# Patient Record
Sex: FEMALE | Race: OTHER | HISPANIC OR LATINO | Employment: FULL TIME | URBAN - METROPOLITAN AREA
[De-identification: names, ages, dates, MRNs, and addresses within clinical notes are randomized per-mention and may not be internally consistent; named-entity substitution may affect disease eponyms.]

---

## 2006-08-18 LAB — EXTERNAL HIV SCREEN: NORMAL

## 2017-06-23 ENCOUNTER — ALLSCRIPTS OFFICE VISIT (OUTPATIENT)
Dept: OTHER | Facility: OTHER | Age: 42
End: 2017-06-23

## 2017-06-23 DIAGNOSIS — Z12.31 ENCOUNTER FOR SCREENING MAMMOGRAM FOR MALIGNANT NEOPLASM OF BREAST: ICD-10-CM

## 2017-06-23 DIAGNOSIS — Z13.6 ENCOUNTER FOR SCREENING FOR CARDIOVASCULAR DISORDERS: ICD-10-CM

## 2017-06-27 ENCOUNTER — ALLSCRIPTS OFFICE VISIT (OUTPATIENT)
Dept: OTHER | Facility: OTHER | Age: 42
End: 2017-06-27

## 2017-06-29 ENCOUNTER — APPOINTMENT (OUTPATIENT)
Dept: LAB | Facility: CLINIC | Age: 42
End: 2017-06-29
Payer: COMMERCIAL

## 2017-06-29 ENCOUNTER — TRANSCRIBE ORDERS (OUTPATIENT)
Dept: LAB | Facility: CLINIC | Age: 42
End: 2017-06-29

## 2017-06-29 DIAGNOSIS — Z13.6 ENCOUNTER FOR SCREENING FOR CARDIOVASCULAR DISORDERS: ICD-10-CM

## 2017-06-29 LAB
ALBUMIN SERPL BCP-MCNC: 3.3 G/DL (ref 3.5–5)
ALP SERPL-CCNC: 102 U/L (ref 46–116)
ALT SERPL W P-5'-P-CCNC: 16 U/L (ref 12–78)
ANION GAP SERPL CALCULATED.3IONS-SCNC: 5 MMOL/L (ref 4–13)
AST SERPL W P-5'-P-CCNC: 11 U/L (ref 5–45)
BASOPHILS # BLD AUTO: 0.04 THOUSANDS/ΜL (ref 0–0.1)
BASOPHILS NFR BLD AUTO: 0 % (ref 0–1)
BILIRUB SERPL-MCNC: 0.39 MG/DL (ref 0.2–1)
BUN SERPL-MCNC: 9 MG/DL (ref 5–25)
CALCIUM SERPL-MCNC: 8.8 MG/DL (ref 8.3–10.1)
CHLORIDE SERPL-SCNC: 104 MMOL/L (ref 100–108)
CHOLEST SERPL-MCNC: 284 MG/DL (ref 50–200)
CO2 SERPL-SCNC: 29 MMOL/L (ref 21–32)
CREAT SERPL-MCNC: 0.6 MG/DL (ref 0.6–1.3)
EOSINOPHIL # BLD AUTO: 0.34 THOUSAND/ΜL (ref 0–0.61)
EOSINOPHIL NFR BLD AUTO: 2 % (ref 0–6)
ERYTHROCYTE [DISTWIDTH] IN BLOOD BY AUTOMATED COUNT: 13.8 % (ref 11.6–15.1)
GFR SERPL CREATININE-BSD FRML MDRD: >60 ML/MIN/1.73SQ M
GLUCOSE P FAST SERPL-MCNC: 98 MG/DL (ref 65–99)
HCT VFR BLD AUTO: 37.2 % (ref 34.8–46.1)
HDLC SERPL-MCNC: 58 MG/DL (ref 40–60)
HGB BLD-MCNC: 12.3 G/DL (ref 11.5–15.4)
LDLC SERPL CALC-MCNC: 180 MG/DL (ref 0–100)
LYMPHOCYTES # BLD AUTO: 4.65 THOUSANDS/ΜL (ref 0.6–4.47)
LYMPHOCYTES NFR BLD AUTO: 30 % (ref 14–44)
MCH RBC QN AUTO: 29.2 PG (ref 26.8–34.3)
MCHC RBC AUTO-ENTMCNC: 33.1 G/DL (ref 31.4–37.4)
MCV RBC AUTO: 88 FL (ref 82–98)
MONOCYTES # BLD AUTO: 1.12 THOUSAND/ΜL (ref 0.17–1.22)
MONOCYTES NFR BLD AUTO: 7 % (ref 4–12)
NEUTROPHILS # BLD AUTO: 9.4 THOUSANDS/ΜL (ref 1.85–7.62)
NEUTS SEG NFR BLD AUTO: 61 % (ref 43–75)
NRBC BLD AUTO-RTO: 0 /100 WBCS
PLATELET # BLD AUTO: 460 THOUSANDS/UL (ref 149–390)
PMV BLD AUTO: 10.1 FL (ref 8.9–12.7)
POTASSIUM SERPL-SCNC: 3.9 MMOL/L (ref 3.5–5.3)
PROT SERPL-MCNC: 8 G/DL (ref 6.4–8.2)
RBC # BLD AUTO: 4.21 MILLION/UL (ref 3.81–5.12)
SODIUM SERPL-SCNC: 138 MMOL/L (ref 136–145)
TRIGL SERPL-MCNC: 229 MG/DL
TSH SERPL DL<=0.05 MIU/L-ACNC: 4.94 UIU/ML (ref 0.36–3.74)
WBC # BLD AUTO: 15.61 THOUSAND/UL (ref 4.31–10.16)

## 2017-06-29 PROCEDURE — 36415 COLL VENOUS BLD VENIPUNCTURE: CPT

## 2017-06-29 PROCEDURE — 80061 LIPID PANEL: CPT

## 2017-06-29 PROCEDURE — 84443 ASSAY THYROID STIM HORMONE: CPT

## 2017-06-29 PROCEDURE — 85025 COMPLETE CBC W/AUTO DIFF WBC: CPT

## 2017-06-29 PROCEDURE — 80053 COMPREHEN METABOLIC PANEL: CPT

## 2017-06-30 ENCOUNTER — ALLSCRIPTS OFFICE VISIT (OUTPATIENT)
Dept: OTHER | Facility: OTHER | Age: 42
End: 2017-06-30

## 2017-07-31 DIAGNOSIS — L40.9 PSORIASIS: ICD-10-CM

## 2017-07-31 DIAGNOSIS — E03.9 HYPOTHYROIDISM: ICD-10-CM

## 2017-10-30 DIAGNOSIS — E78.5 HYPERLIPIDEMIA: ICD-10-CM

## 2018-01-12 VITALS
HEART RATE: 89 BPM | RESPIRATION RATE: 16 BRPM | WEIGHT: 245.4 LBS | DIASTOLIC BLOOD PRESSURE: 80 MMHG | SYSTOLIC BLOOD PRESSURE: 128 MMHG | HEIGHT: 61 IN | BODY MASS INDEX: 46.33 KG/M2 | TEMPERATURE: 97.5 F

## 2018-01-12 NOTE — PROGRESS NOTES
Assessment    1  Encounter for preventive health examination (V70 0) (Z00 00)    Plan  Encounter for screening mammogram for breast cancer    · * MAMMO SCREENING BILATERAL W CAD; Status:Hold For - Scheduling; Requested  [id number]; Health Maintenance    · 1 - Pramod Leblanc MD, Magdy Tiwari  (Obstetrics/Gynecology) Co-Management  *  Status: Hold For -  Scheduling  Requested for: [id number]  Care Summary provided  : Yes   · Benefits of Exercise/Physical Activity; Status:Complete;   Done: [id number]   · Eat a low fat and low cholesterol diet ; Status:Complete;   Done: [id number]   · Some eating tips that can help you lose weight ; Status:Complete;   Done: 70JDD0823   · We recommend that you bring your body mass index down to 26 ; Status:Complete;    Done: 66RGH0106  Screening for cardiovascular condition    · (1) CBC/PLT/DIFF; Status:Active; Requested [id number]:[id number];    · (1) COMPREHENSIVE METABOLIC PANEL; Status:Active; Requested [id number]:[id number];    · (1) LIPID PANEL, FASTING; Status:Active; Requested GHK:[id number];    · (1) TSH; Status:Active; Requested SII:[id number];     Discussion/Summary  health maintenance visit healthy adult female Currently, she eats a healthy diet and has an inadequate exercise regimen  the risks and benefits of cervical cancer screening were discussed next cervical cancer screening is due now referral made Testing was done today for denies  Breast cancer screening: the risks and benefits of breast cancer screening were discussed and mammogram has been ordered  Colorectal cancer screening: colorectal cancer screening is not indicated  Osteoporosis screening: bone mineral density testing is not indicated  Screening lab work includes hemoglobin, glucose, lipid profile and thyroid function testing  The immunizations are up to date  Advice and education were given regarding nutrition, weight bearing exercise, weight loss and sunscreen use  Patient discussion: discussed with the patient  Physical complete  Labs ordered  Await labs and mammomgram    The treatment plan was reviewed with the patient/guardian  The patient/guardian understands and agrees with the treatment plan      Chief Complaint  Pt here for physical and establish pcp      History of Present Illness  HM, Adult Female: The patient is being seen for a health maintenance evaluation  The last health maintenance visit was 7 year(s) ago  Social History: Household members include spouse, 1 daughter(s) and 1 son(s)  She is   Work status: working full time  The patient has never smoked cigarettes  She reports occasional alcohol use  She has never used illicit drugs  General Health: The patient's health since the last visit is described as good  She has regular dental visits  She complains of vision problems  Vision care includes wearing glasses and an eye examination within the last year  She denies hearing loss  Immunizations status: up to date  Lifestyle:  She consumes a diverse and healthy diet  She has weight concerns  Weight control issues: obesity  She does not exercise regularly  She exercises less than three times a week for 30 or more minutes per session  Exercise includes walking  She does not use tobacco  She consumes alcohol  She reports occasional alcohol use  She denies drug use  Reproductive health: the patient is premenopausal   she reports abnormal menses  Menstrual history: LMP: the last menstrual period was 6/23/17  Recent menstrual periods: bleeding has been normal  The cycles have been regular  The duration of her recent periods has been regular  she uses contraception (2010)  For contraception, she has had a tubal occlusion  she is sexually active  She is monogamous with a male partner  pregnancy history: G 3P 1, 2(miscarriages: 1 )  Screening: cancer screening reviewed and updated  Cervical cancer screening includes a pap smear performed 2012   Breast cancer screening includes no previous mammogram  She hasn't been previously screened for colorectal cancer  metabolic screening reviewed and updated  Metabolic screening includes no previous lipid profile, no previous glucose screening and no previous thyroid function test    risk screening reviewed and current  Cardiovascular risk factors: no illicit drug use  Safety elements used: seat belt, sunscreen, smoke detector and carbon monoxide detector, but no CPR training for the patient  Risk findings: no domestic violence, no guns at home and no travel to developing areas  HPI: patient very involved in using essential oils and alternative medicine        Review of Systems    Constitutional: No fever, no chills, feels well, no tiredness, no recent weight gain or weight loss  Eyes: No complaints of eye pain, no red eyes, no eyesight problems, no discharge, no dry eyes, no itching of eyes  ENT: no complaints of earache, no loss of hearing, no nose bleeds, no nasal discharge, no sore throat, no hoarseness  Cardiovascular: No complaints of slow heart rate, no fast heart rate, no chest pain, no palpitations, no leg claudication, no lower extremity edema  Respiratory: No complaints of shortness of breath, no wheezing, no cough, no SOB on exertion, no orthopnea, no PND  Gastrointestinal: No complaints of abdominal pain, no constipation, no nausea or vomiting, no diarrhea, no bloody stools  Genitourinary: No complaints of dysuria, no incontinence, no pelvic pain, no dysmenorrhea, no vaginal discharge or bleeding  Musculoskeletal: No complaints of arthralgias, no myalgias, no joint swelling or stiffness, no limb pain or swelling  Integumentary: No complaints of skin rash or lesions, no itching, no skin wounds, no breast pain or lump  Neurological: No complaints of headache, no confusion, no convulsions, no numbness, no dizziness or fainting, no tingling, no limb weakness, no difficulty walking     Psychiatric: Not suicidal, no sleep disturbance, no anxiety or depression, no change in personality, no emotional problems  Endocrine: No complaints of proptosis, no hot flashes, no muscle weakness, no deepening of the voice, no feelings of weakness  Hematologic/Lymphatic: No complaints of swollen glands, no swollen glands in the neck, does not bleed easily, does not bruise easily  ROS reviewed  Past Medical History    · History of asthma (V12 69) (Z87 09)   · History of skin disorder (V13 3) (Z87 2)    Surgical History    · History of  Section   · History of Knee Surgery    Family History  Mother    · Family history of hypertension (V17 49) (Z82 49)  Paternal Grandmother    · Family history of malignant neoplasm of stomach (V16 0) (Z80 0)  Paternal Uncle    · Family history of malignant neoplasm of stomach (V16 0) (Z80 0)    Social History    · Always uses seat belt   · [employer] ([address]   · Employed   · Has 2 children   · Lives in private house   · Lives with children   · Lives with spouse   ·    · No advance directives (V49 89) (Z78 9)   · No alcohol use   · No caffeine use   · No history of alcohol use (Z78 9)   · No living will   · Primary language is English   · Supportive and safe    Current Meds   1  No Reported Medications Recorded    Allergies    1  No Known Environmental Allergies   2  No Known Food Allergies    Vitals   Recorded: 13KLO4336 09:31AM   Temperature 97 5 F   Heart Rate 89   Respiration 16   Systolic 639   Diastolic 80   Height 5 ft 1 2 in   Weight 245 lb 6 4 oz   BMI Calculated 46 06   BSA Calculated 2 06     Physical Exam    Constitutional   General appearance: No acute distress, well appearing and well nourished  well developed and well nourished  Head and Face   Head and face: Normal     Eyes   Conjunctiva and lids: No swelling, erythema or discharge      Ears, Nose, Mouth, and Throat   External inspection of ears and nose: Normal     Otoscopic examination: Abnormal   The left tympanic membrane was normal  The right external canal had a cerumen impaction  The left external canal was normal    Nasal mucosa, septum, and turbinates: Normal without edema or erythema  Lips, teeth, and gums: Normal, good dentition  Oropharynx: Normal with no erythema, edema, exudate or lesions  Neck   Neck: Supple, symmetric, trachea midline, no masses  Thyroid: Normal, no thyromegaly  Pulmonary   Respiratory effort: No increased work of breathing or signs of respiratory distress  Auscultation of lungs: Clear to auscultation  Cardiovascular   Palpation of heart: Normal PMI, no thrills  Auscultation of heart: Normal rate and rhythm, normal S1 and S2, no murmurs  Examination of extremities for edema and/or varicosities: Normal     Musculoskeletal   Gait and station: Normal     Skin   Examination of the skin for lesions: Abnormal   Multiple plaque(s) with expanding borders, without sharply demarcated borders  Described as with scales, but non-tender and not warm  (large amounts on both lower extremities extending up to knee  behind the neck large amounts  )   Palpation of skin and subcutaneous tissue: Normal turgor  Psychiatric   Judgment and insight: Normal     Orientation to person, place, and time: Normal     Mood and affect: Normal        Results/Data  PHQ-9 Adult Depression Screening 23Jun2017 09:35AM User, [facility]     Test Name Result Flag Reference   PHQ-9 Adult Depression Score 0     Over the last two weeks, how often have you been bothered by any of the following problems?   Little interest or pleasure in doing things: Not at all - 0  Feeling down, depressed, or hopeless: Not at all - 0  Trouble falling or staying asleep, or sleeping too much: Not at all - 0  Feeling tired or having little energy: Not at all - 0  Poor appetite or over eating: Not at all - 0  Feeling bad about yourself - or that you are a failure or have let yourself or your family down: Not at all - 0  Trouble concentrating on things, such as reading the newspaper or watching television: Not at all - 0  Moving or speaking so slowly that other people could have noticed  Or the opposite -  being so fidgety or restless that you have been moving around a lot more than usual: Not at all - 0  Thoughts that you would be better off dead, or of hurting yourself in some way: Not at all - 0   PHQ-9 Adult Depression Screening Negative     PHQ-9 Difficulty Level Not difficult at all     PHQ-9 Severity No Depression         Signatures   Electronically signed by : Jin Gordon; Jun 23 2017 10:12AM EST                       (Author)    Electronically signed by :  Nicole Dsouza MD; Jun 23 2017 11:11AM EST                       (Author)

## 2018-01-13 VITALS
BODY MASS INDEX: 46.67 KG/M2 | HEART RATE: 84 BPM | TEMPERATURE: 97.7 F | SYSTOLIC BLOOD PRESSURE: 126 MMHG | RESPIRATION RATE: 18 BRPM | WEIGHT: 247.2 LBS | DIASTOLIC BLOOD PRESSURE: 82 MMHG | HEIGHT: 61 IN

## 2018-01-13 VITALS
TEMPERATURE: 98.1 F | HEART RATE: 82 BPM | BODY MASS INDEX: 46.63 KG/M2 | SYSTOLIC BLOOD PRESSURE: 118 MMHG | WEIGHT: 246.99 LBS | RESPIRATION RATE: 18 BRPM | DIASTOLIC BLOOD PRESSURE: 76 MMHG | HEIGHT: 61 IN

## 2018-02-01 PROBLEM — E78.5 HYPERLIPIDEMIA: Status: ACTIVE | Noted: 2017-06-30

## 2018-02-01 PROBLEM — L40.9 PSORIASIS: Status: ACTIVE | Noted: 2017-06-23

## 2018-02-01 PROBLEM — E03.9 HYPOTHYROID: Status: ACTIVE | Noted: 2017-06-30

## 2018-02-02 ENCOUNTER — OFFICE VISIT (OUTPATIENT)
Dept: FAMILY MEDICINE CLINIC | Facility: CLINIC | Age: 43
End: 2018-02-02
Payer: COMMERCIAL

## 2018-02-02 VITALS
RESPIRATION RATE: 16 BRPM | BODY MASS INDEX: 47.73 KG/M2 | DIASTOLIC BLOOD PRESSURE: 70 MMHG | TEMPERATURE: 98.4 F | HEART RATE: 90 BPM | SYSTOLIC BLOOD PRESSURE: 118 MMHG | HEIGHT: 61 IN | WEIGHT: 252.8 LBS

## 2018-02-02 DIAGNOSIS — E03.9 ACQUIRED HYPOTHYROIDISM: ICD-10-CM

## 2018-02-02 DIAGNOSIS — L40.9 PSORIASIS: Primary | ICD-10-CM

## 2018-02-02 DIAGNOSIS — E78.00 PURE HYPERCHOLESTEROLEMIA: ICD-10-CM

## 2018-02-02 PROCEDURE — 99214 OFFICE O/P EST MOD 30 MIN: CPT | Performed by: NURSE PRACTITIONER

## 2018-02-02 RX ORDER — CLOBETASOL PROPIONATE 0.5 MG/G
CREAM TOPICAL 2 TIMES DAILY
Qty: 60 G | Refills: 3 | Status: SHIPPED | OUTPATIENT
Start: 2018-02-02 | End: 2019-10-15 | Stop reason: SDUPTHER

## 2018-02-02 NOTE — PROGRESS NOTES
Assessment/Plan:    Acquired hypothyroidism  Patient has been taking thyromin capsules  Pure hypercholesterolemia  Patient has been monitoring diet  Due for lab recheck  Diagnoses and all orders for this visit:    Psoriasis  -     clobetasol (TEMOVATE) 0 05 % cream; Apply topically 2 (two) times a day    Acquired hypothyroidism  Comments:  No thyroid studies ordered today  We will wait to check adherance to thyromin  Recheck in about 3 months  Pure hypercholesterolemia  Comments:  we will recheck labs at next thyroid check  Other orders  -     Cancel: TSH, 3rd generation  -     Cancel: T4, free; Future  -     Cancel: T3, free; Future  -     Cancel: Lipid panel          Subjective:      Patient ID: Chilango Otto is a 43 y o  female  Psoriasis   This is a chronic problem  The current episode started more than 1 year ago  The problem has been gradually worsening since onset  The affected locations include the scalp, left buttock, right buttock, left ear, right eye, groin, right ear, left upper leg, left lower leg, right upper leg, right lower leg, back and chest  Associated symptoms include pain (skin sensitivity) and plaques  Pertinent negatives include no arthritis  Past treatments include moisturizers (shea butter, cocunut oil TID , shampoo)  Thyroid Problem   Presents for follow-up visit  Patient reports no fatigue  (Patient states she has not been taking the thyromin  She only did for about a month  ) Symptom course: unsure  The following portions of the patient's history were reviewed and updated as appropriate: allergies, current medications, past family history, past medical history, past social history, past surgical history and problem list     Review of Systems   Constitutional: Negative for chills and fatigue  Respiratory: Negative for chest tightness and shortness of breath  Cardiovascular: Negative for chest pain  Musculoskeletal: Negative for arthritis     Skin: Positive for rash  Objective:     Physical Exam   Constitutional: Vital signs are normal  She appears well-developed and well-nourished  Neck: No thyromegaly present  Cardiovascular: Normal rate, regular rhythm and normal heart sounds  Pulmonary/Chest: Effort normal and breath sounds normal    Skin: Rash noted  Rash is macular  Large amounts of plaques and scales throughout the body, chest, upper limbs, lower limbs, ear, neck, back and scalp  The face is not involved

## 2018-03-30 ENCOUNTER — APPOINTMENT (OUTPATIENT)
Dept: LAB | Facility: CLINIC | Age: 43
End: 2018-03-30
Payer: COMMERCIAL

## 2018-03-30 ENCOUNTER — TRANSCRIBE ORDERS (OUTPATIENT)
Dept: LAB | Facility: CLINIC | Age: 43
End: 2018-03-30

## 2018-03-30 DIAGNOSIS — Z79.899 NEED FOR PROPHYLACTIC CHEMOTHERAPY: Primary | ICD-10-CM

## 2018-03-30 LAB
ALBUMIN SERPL BCP-MCNC: 3.1 G/DL (ref 3.5–5)
ALP SERPL-CCNC: 97 U/L (ref 46–116)
ALT SERPL W P-5'-P-CCNC: 13 U/L (ref 12–78)
ANION GAP SERPL CALCULATED.3IONS-SCNC: 5 MMOL/L (ref 4–13)
AST SERPL W P-5'-P-CCNC: 11 U/L (ref 5–45)
BASOPHILS # BLD AUTO: 0.02 THOUSANDS/ΜL (ref 0–0.1)
BASOPHILS NFR BLD AUTO: 0 % (ref 0–1)
BILIRUB SERPL-MCNC: 0.7 MG/DL (ref 0.2–1)
BUN SERPL-MCNC: 13 MG/DL (ref 5–25)
CALCIUM SERPL-MCNC: 8.6 MG/DL (ref 8.3–10.1)
CHLORIDE SERPL-SCNC: 106 MMOL/L (ref 100–108)
CO2 SERPL-SCNC: 27 MMOL/L (ref 21–32)
CREAT SERPL-MCNC: 0.6 MG/DL (ref 0.6–1.3)
EOSINOPHIL # BLD AUTO: 0.48 THOUSAND/ΜL (ref 0–0.61)
EOSINOPHIL NFR BLD AUTO: 3 % (ref 0–6)
ERYTHROCYTE [DISTWIDTH] IN BLOOD BY AUTOMATED COUNT: 13.6 % (ref 11.6–15.1)
GFR SERPL CREATININE-BSD FRML MDRD: 113 ML/MIN/1.73SQ M
GLUCOSE P FAST SERPL-MCNC: 93 MG/DL (ref 65–99)
HAV IGM SER QL: NORMAL
HBV CORE IGM SER QL: NORMAL
HBV SURFACE AG SER QL: NORMAL
HCT VFR BLD AUTO: 36.7 % (ref 34.8–46.1)
HCV AB SER QL: NORMAL
HGB BLD-MCNC: 12.1 G/DL (ref 11.5–15.4)
LYMPHOCYTES # BLD AUTO: 4.54 THOUSANDS/ΜL (ref 0.6–4.47)
LYMPHOCYTES NFR BLD AUTO: 31 % (ref 14–44)
MCH RBC QN AUTO: 28.9 PG (ref 26.8–34.3)
MCHC RBC AUTO-ENTMCNC: 33 G/DL (ref 31.4–37.4)
MCV RBC AUTO: 88 FL (ref 82–98)
MONOCYTES # BLD AUTO: 1.05 THOUSAND/ΜL (ref 0.17–1.22)
MONOCYTES NFR BLD AUTO: 7 % (ref 4–12)
NEUTROPHILS # BLD AUTO: 8.49 THOUSANDS/ΜL (ref 1.85–7.62)
NEUTS SEG NFR BLD AUTO: 59 % (ref 43–75)
NRBC BLD AUTO-RTO: 0 /100 WBCS
PLATELET # BLD AUTO: 403 THOUSANDS/UL (ref 149–390)
PMV BLD AUTO: 10.1 FL (ref 8.9–12.7)
POTASSIUM SERPL-SCNC: 4.1 MMOL/L (ref 3.5–5.3)
PROT SERPL-MCNC: 7.6 G/DL (ref 6.4–8.2)
RBC # BLD AUTO: 4.19 MILLION/UL (ref 3.81–5.12)
SODIUM SERPL-SCNC: 138 MMOL/L (ref 136–145)
WBC # BLD AUTO: 14.62 THOUSAND/UL (ref 4.31–10.16)

## 2018-03-30 PROCEDURE — 80074 ACUTE HEPATITIS PANEL: CPT

## 2018-03-30 PROCEDURE — 86480 TB TEST CELL IMMUN MEASURE: CPT

## 2018-03-30 PROCEDURE — 36415 COLL VENOUS BLD VENIPUNCTURE: CPT

## 2018-03-30 PROCEDURE — 80053 COMPREHEN METABOLIC PANEL: CPT

## 2018-03-30 PROCEDURE — 85025 COMPLETE CBC W/AUTO DIFF WBC: CPT

## 2018-04-01 LAB
ANNOTATION COMMENT IMP: NORMAL
GAMMA INTERFERON BACKGROUND BLD IA-ACNC: 0.03 IU/ML
M TB IFN-G BLD-IMP: NEGATIVE
M TB IFN-G CD4+ BCKGRND COR BLD-ACNC: 0.01 IU/ML
M TB IFN-G CD4+ T-CELLS BLD-ACNC: 0.04 IU/ML
MITOGEN IGNF BLD-ACNC: 6.13 IU/ML
QUANTIFERON-TB GOLD IN TUBE: NORMAL
SERVICE CMNT-IMP: NORMAL

## 2018-08-21 ENCOUNTER — CLINICAL SUPPORT (OUTPATIENT)
Dept: BARIATRICS | Facility: CLINIC | Age: 43
End: 2018-08-21

## 2018-08-21 VITALS
HEIGHT: 61 IN | BODY MASS INDEX: 49.84 KG/M2 | HEART RATE: 80 BPM | SYSTOLIC BLOOD PRESSURE: 122 MMHG | DIASTOLIC BLOOD PRESSURE: 84 MMHG | TEMPERATURE: 97.4 F | WEIGHT: 264 LBS

## 2018-08-21 DIAGNOSIS — E66.01 MORBID (SEVERE) OBESITY DUE TO EXCESS CALORIES (HCC): Primary | ICD-10-CM

## 2018-08-21 DIAGNOSIS — E66.01 MORBID OBESITY (HCC): Primary | ICD-10-CM

## 2018-08-21 PROCEDURE — RECHECK: Performed by: DIETITIAN, REGISTERED

## 2018-08-21 NOTE — PROGRESS NOTES
Bariatric Behavioral Health Evaluation    Presenting Problem: Patient has been unsuccessful with weight loss efforts  Patient wants to improve quality of life  Is the patient seeking Bariatric Surgery Eval? Yes  If yes how long have you researched this surgery option  Patient has considered bariatric surgery for 3 years  Psychiatric/Psychological Treatment Diagnosis: None reported     Outpatient Counselor No     Psychiatrist No     Have you had Inpatient Treatment? No    Family Constellation (include relationship with each and Psych/Med HX) None reported      Mother is 61years old, father is 72years old, 2 sisters, and 1 brother   14 years; spouse supports bariatric surgery  Patient has 2 children  Domestic Violence No    Abuse History:   None reported     Additional comments/stressors related to family/relationships/peer support: current weight  Physical/Psychological Assessment:     Appearance: appropriate  Sociability: average  Affect: appropriate  Mood: calm  Thought Process: coherent  Speech: normal  Content: no impairment  Orientation: person  Yes   Insight: emotional  good    Risk Assessment:     none    Recommendations: Recommended for surgery  yes    Risk of Harm to Self or Others:  None reported     Access to weapons no     Based on the previous information, the client presents the following risk of harm to self or others: low     Note   Cassandra Orellana Completed Behavioral Health Assessment  Provided patient education as needed  Patient denies  to  Axis 1 diagnosis  Patient  meets criteria for [facility] bariatric  surgery program and is therefore referred to surgeon       BARIATRIC SURGERY EDUCATION CHECKLIST    I have received education related to my bariatric surgery process and understand:    Patients may be required to complete a psychiatric evaluation and receive clearance for surgery from their psychiatrist     Patients who undergo weight loss surgery are at higher risk of increased mental health concerns and suicide attempts  Patients may be required to complete a full substance abuse evaluation and then complete all treatment recommendations prior to surgery  If diagnosis of abuse/dependence results, patient may be required to remain sober for one (1) year before having bariatric surgery  Patients on psychiatric medications should check with their provider to discuss psychiatric medications and the changes in absorption  Patient should discuss all time release medications with provider and take all medications as prescribed  The recommendation is that there is no use of  any tobacco products, Hookah or  vapes for the bariatric post-operation patient  Bariatric surgery patients should not consume alcohol as a post-operative patient as it may increase risk of numerous health conditions including but not limited to alcohol abuse and ulcers  There is a possibility of weight regain if patient does not follow all program guidelines and recommendations  Bariatric surgery patients should exercise thirty (30) to sixty (60) minutes per day to maintain post-surgical weight loss  Research indicates that bariatric patients are more successful when they see a therapist for up to two (2) years post-op  Patients will follow all medical and dietary recommendations provided  Patient will keep all scheduled appointments and follow up with their physician for a minimum of five (5) years  Patient will take all vitamins as recommended  Post-operative vitamins are life-long  Patient reviewed Bariatric Surgery Education Checklist and agrees they have received education on these issues

## 2018-08-21 NOTE — PROGRESS NOTES
Bariatric Nutrition Assessment Note    Type of surgery    Preop  Surgery Date: pendign  Surgeon: Dr Jacquie Navarrete  37 y o   female     Wt with BMI of 25: 132 4#  Pre-Op Excess Wt: 131 6#  Blood pressure 122/84, pulse 80, temperature (!) 97 4 °F (36 3 °C), temperature source Tympanic, height 5' 1" (1 549 m), weight 120 kg (264 lb), not currently breastfeeding      Weight History   Onset of Obesity: Childhood  Family history of obesity: Yes  Wt Loss Attempts: Counseling with  MD  Exercise  High Protein/Low CHO diets (Atkins, Union, etc )  Self Created Diets (Portion Control, Healthy Food Choices, etc )  Maximum Wt Lost: 20 #    Review of History and Medications   Past Medical History:   Diagnosis Date    Asthma     Disease of thyroid gland     Morbid obesity ([address] )     Psoriasis     Skin disorder      Past Surgical History:   Procedure Laterality Date     SECTION      KNEE SURGERY       Social History     Social History    Marital status: /Civil Union     Spouse name: N/A    Number of children: 2    Years of education: N/A     Occupational History    Employed      Social History Main Topics    Smoking status: Never Smoker    Smokeless tobacco: Never Used    Alcohol use Yes      Comment: social     Drug use: No    Sexual activity: Yes     Partners: Male     Birth control/ protection: None     Other Topics Concern    None     Social History Narrative    Always uses seat belt    [employer]    Lives in private house    Lives with children    Lives with spouse    No advance directives    No caffeine use    No Living will    Supportive and safe                   Current Outpatient Prescriptions:     ADIPEX-P 37 5 MG tablet, Take 1 tablet by mouth daily, Disp: , Rfl: 1    clobetasol (TEMOVATE) 0 05 % cream, Apply topically 2 (two) times a day, Disp: 60 g, Rfl: 3    Secukinumab (COSENTYX SC), Inject under the skin, Disp: , Rfl:   Food Intake and Lifestyle Assessment   Food Intake Assessment completed via food log brought by patient  Breakfast: young living protein shake (25 grams protein, 10 grams sugar) mixed with almond milk  Snack: none  Lunch: left overs from dinner  Snack: handful of peanuts  Dinner: chicken, quinoa, pepper  Snack: usually something sweet, cookies or ice cream  Beverage intake:8-12oz of coffee with milk and sugar, water (at least 64 oz), sweetened iced tea,   Protein supplement: Young living protein powder (25 grams protein whey concentrate, pea protein isolate; 10 grams sugar) mixes with almond milk  Estimated protein intake per day: at least 60 grams  Estimated fluid intake per day: >64 oz   Meals eaten away from home: 1 meal a week (pizza, chinese)  Typical meal pattern: 3 meals per day and 1-2 snacks per day  Eating Behaviors: Large portion sizes, Emotional eating and Craves sweet foods  Food allergies or intolerances: No Known Allergies  Cultural or Adventist considerations: n/a    Physical Assessment  Physical Activity  Types of exercise: walking the dog (20 minutes daily)  Current physical limitations: knee pain    Psychosocial Assessment   Support systems: spouse  Socioeconomic factors: n/a    Nutrition Diagnosis  Diagnosis: Overweight / Obesity (NC-3 3), Undesirable food choices (NB-1 7) and Disordered eating pattern (NB-1 5)  Related to: Physical inactivity and Excessive energy intake  As Evidenced by: BMI >25     Nutrition Prescription: Recommend the following diet  Low fat, Low sugar and High protein    Interventions and Teaching   Discussed pre-op and post-op nutrition guidelines  Patient educated and handouts provided    Capacity of post-surgery stomach  Diet progression  Adequate hydration  Sugar and fat restriction to decrease "dumping syndrome"  Exercise  Suggestions for pre-op diet  Nutrition considerations after surgery  Protein supplements  Meal planning and preparation  Dietary and lifestyle changes  Intuitive eating  Techniques for self monitoring and keeping daily food journal  Potential for food intolerance after surgery, and ways to deal with them including: lactose intolerance, nausea, reflux, vomiting, diarrhea, food intolerance, appetite changes, gas  Vitamin / Mineral supplementation of Multivitamin with minerals and Vitamin D    Education provided to: patient    Barriers to learning: No barriers identified  Readiness to change: action    Prior research on procedure: friends or family    Comprehension: verbalizes understanding     Expected Compliance: good  Recommendations  Pt is an appropriate candidate for surgery   Yes    Evaluation / Monitoring  Dietitian to Monitor: Eating pattern as discussed Tolerance of nutrition prescription Body weight Lab values Physical activity    Goals  Eliminate sugar sweetened beverages, Food journal, Exercise 30 minutes 5 times per week, Complete lession plans 1-6 and Eat 3 meals per day    Time Spent:   45 Minutes

## 2018-08-31 ENCOUNTER — OFFICE VISIT (OUTPATIENT)
Dept: BARIATRICS | Facility: CLINIC | Age: 43
End: 2018-08-31
Payer: COMMERCIAL

## 2018-08-31 VITALS
BODY MASS INDEX: 49.65 KG/M2 | TEMPERATURE: 97.6 F | SYSTOLIC BLOOD PRESSURE: 122 MMHG | WEIGHT: 263 LBS | DIASTOLIC BLOOD PRESSURE: 86 MMHG | HEIGHT: 61 IN | HEART RATE: 84 BPM

## 2018-08-31 DIAGNOSIS — E66.01 MORBID (SEVERE) OBESITY DUE TO EXCESS CALORIES (HCC): Primary | ICD-10-CM

## 2018-08-31 DIAGNOSIS — E78.00 PURE HYPERCHOLESTEROLEMIA: ICD-10-CM

## 2018-08-31 PROCEDURE — 99204 OFFICE O/P NEW MOD 45 MIN: CPT | Performed by: SURGERY

## 2018-08-31 NOTE — LETTER
2018     Baljinder Shaffer, [address] [facility]    Patient: Dulce Pineda   YOB: 1975   Date of Visit: 2018       Dear Dr Yusuf Dumont: Thank you for referring Dulce Pineda to me for evaluation  Below are my notes for this consultation  If you have questions, please do not hesitate to call me  I look forward to following your patient along with you  Sincerely,        Alessandra Greene MD        CC: No Recipients  Alessandra Greene MD  2018  9:58 AM  Sign at close encounter      [address] [place] [address] 37 y o  female MRN: [id number]  Unit/Bed#:  Encounter: [id number]      HPI:  Dulce Pineda is a 37 y o  female who presents with a longstanding history of morbid obesity and inability to sustain a meaningful weight loss  Here today to discuss bariatric options  Visit type: initial visit    Symptoms: inability to loss weight    Associated Symptoms: anxiety    Associated Conditions: hyperlipidemia and abdominal obesity  Disease Complications: none  Weight Loss Interest: high  Previous Diet Trials: low calorie     Exercise Frequency:infrequency  Types of Exercise: walking        Review of Systems   All other systems reviewed and are negative        Historical Information   Past Medical History:   Diagnosis Date    Asthma     Disease of thyroid gland     Morbid obesity ([address] )     Psoriasis     Skin disorder      Past Surgical History:   Procedure Laterality Date     SECTION      KNEE SURGERY       Social History   History   Alcohol Use    Yes     Comment: social      History   Drug Use No     History   Smoking Status    Never Smoker   Smokeless Tobacco    Never Used     Family History: non-contributory    Meds/Allergies   all medications and allergies reviewed  No Known Allergies    Objective       Current Vitals:   Blood Pressure: 122/86 (18 0932)  Pulse: 84 (08/31/18 0932)  Temperature: 97 6 °F (36 4 °C) (08/31/18 0932)  Temp Source: Tympanic (08/31/18 0932)  Height: 5' 1" (154 9 cm) (08/31/18 0932)  Weight - Scale: 119 kg (263 lb) (08/31/18 0932)        Invasive Devices          No matching active lines, drains, or airways          Physical Exam   Constitutional: She is oriented to person, place, and time  She appears well-developed and well-nourished  No distress  Neurological: She is alert and oriented to person, place, and time  Psychiatric: She has a normal mood and affect  Her behavior is normal  Judgment and thought content normal    Vitals reviewed  Lab Results: I have personally reviewed pertinent lab results  Imaging: I have personally reviewed pertinent reports  EKG, Pathology, and Other Studies: I have personally reviewed pertinent reports  Assessment/PLAN:    37 y o  yo female with a long standing h/o of obesity and inability to sustain any meaningful weight loss on her own despite several attempts  She is interested in the Laparoscopic gastric bypass or sleeve gastrectomy  As a part of her pre op evaluation, she will be referred to a cardiologist and for a sleep evaluation and consult  She needs an EGD to evaluate the anatomy of her GI tract prior to the operation  I have spent over 45 minutes with her face to face in the office today discussing her options and details of the surgery  We have seen an animation of the surgery on the computer that illustrates how the operation is done and how the anatomy will be altered with the procedure  Over 50% of this was coordinating care  She was given the opportunity to ask questions and I have answered all of them  I have discussed and educated the patient with regards to the components of our multidisciplinary program and the importance of compliance and follow up in the post operative period   The patient was also instructed with regards to the importance of behavior modification, nutritional counseling, support meeting attendance and lifestyle changes that are important to ensure success  Although there is a great statistical chance of improvement or even resolution of most of her associated comorbidities, the results vary from patient to patient and they largely depend on her commitment and compliance  She needs to lose 13 lbs prior to the operation        Mitchel Grady MD  8/31/2018  9:45 AM

## 2018-08-31 NOTE — PROGRESS NOTES
BARIATRIC INITIAL CONSULT - BARIATRIC SURGERY    Leana Alfaro 37 y o  female MRN: [id number]  Unit/Bed#:  Encounter: [id number]      HPI:  Leana Alfaro is a 37 y o  female who presents with a longstanding history of morbid obesity and inability to sustain a meaningful weight loss  Here today to discuss bariatric options  Visit type: initial visit    Symptoms: inability to loss weight    Associated Symptoms: anxiety    Associated Conditions: hyperlipidemia and abdominal obesity  Disease Complications: none  Weight Loss Interest: high  Previous Diet Trials: low calorie     Exercise Frequency:infrequency  Types of Exercise: walking        Review of Systems   All other systems reviewed and are negative  Historical Information   Past Medical History:   Diagnosis Date    Asthma     Disease of thyroid gland     Morbid obesity ([address] )     Psoriasis     Skin disorder      Past Surgical History:   Procedure Laterality Date     SECTION      KNEE SURGERY       Social History   History   Alcohol Use    Yes     Comment: social      History   Drug Use No     History   Smoking Status    Never Smoker   Smokeless Tobacco    Never Used     Family History: non-contributory    Meds/Allergies   all medications and allergies reviewed  No Known Allergies    Objective       Current Vitals:   Blood Pressure: 122/86 (18)  Pulse: 84 (18)  Temperature: 97 6 °F (36 4 °C) (18)  Temp Source: Tympanic (18)  Height: 5' 1" (154 9 cm) (18)  Weight - Scale: 119 kg (263 lb) (18)        Invasive Devices          No matching active lines, drains, or airways          Physical Exam   Constitutional: She is oriented to person, place, and time  She appears well-developed and well-nourished  No distress  Neurological: She is alert and oriented to person, place, and time  Psychiatric: She has a normal mood and affect   Her behavior is normal  Judgment and thought content normal    Vitals reviewed  Lab Results: I have personally reviewed pertinent lab results  Imaging: I have personally reviewed pertinent reports  EKG, Pathology, and Other Studies: I have personally reviewed pertinent reports  Assessment/PLAN:    37 y o  yo female with a long standing h/o of obesity and inability to sustain any meaningful weight loss on her own despite several attempts  She is interested in the Laparoscopic gastric bypass or sleeve gastrectomy  As a part of her pre op evaluation, she will be referred to a cardiologist and for a sleep evaluation and consult  She needs an EGD to evaluate the anatomy of her GI tract prior to the operation  I have spent over 45 minutes with her face to face in the office today discussing her options and details of the surgery  We have seen an animation of the surgery on the computer that illustrates how the operation is done and how the anatomy will be altered with the procedure  Over 50% of this was coordinating care  She was given the opportunity to ask questions and I have answered all of them  I have discussed and educated the patient with regards to the components of our multidisciplinary program and the importance of compliance and follow up in the post operative period  The patient was also instructed with regards to the importance of behavior modification, nutritional counseling, support meeting attendance and lifestyle changes that are important to ensure success  Although there is a great statistical chance of improvement or even resolution of most of her associated comorbidities, the results vary from patient to patient and they largely depend on her commitment and compliance  She needs to lose 13 lbs prior to the operation        Alessandra Greene MD  8/31/2018  9:45 AM

## 2018-09-11 ENCOUNTER — ANESTHESIA EVENT (OUTPATIENT)
Dept: GASTROENTEROLOGY | Facility: HOSPITAL | Age: 43
End: 2018-09-11
Payer: COMMERCIAL

## 2018-09-12 ENCOUNTER — ANESTHESIA (OUTPATIENT)
Dept: GASTROENTEROLOGY | Facility: HOSPITAL | Age: 43
End: 2018-09-12
Payer: COMMERCIAL

## 2018-09-12 ENCOUNTER — HOSPITAL ENCOUNTER (OUTPATIENT)
Facility: HOSPITAL | Age: 43
Setting detail: OUTPATIENT SURGERY
Discharge: HOME | End: 2018-09-12
Attending: SURGERY | Admitting: SURGERY
Payer: COMMERCIAL

## 2018-09-12 VITALS
OXYGEN SATURATION: 98 % | WEIGHT: 240 LBS | HEIGHT: 61 IN | DIASTOLIC BLOOD PRESSURE: 75 MMHG | BODY MASS INDEX: 45.31 KG/M2 | HEART RATE: 75 BPM | SYSTOLIC BLOOD PRESSURE: 122 MMHG | TEMPERATURE: 98.2 F | RESPIRATION RATE: 18 BRPM

## 2018-09-12 DIAGNOSIS — E66.01 MORBID OBESITY (HCC): ICD-10-CM

## 2018-09-12 LAB — EXT PREGNANCY TEST URINE: NEGATIVE

## 2018-09-12 PROCEDURE — 81025 URINE PREGNANCY TEST: CPT | Performed by: ANESTHESIOLOGY

## 2018-09-12 PROCEDURE — 88342 IMHCHEM/IMCYTCHM 1ST ANTB: CPT | Performed by: PATHOLOGY

## 2018-09-12 PROCEDURE — 43239 EGD BIOPSY SINGLE/MULTIPLE: CPT | Performed by: SURGERY

## 2018-09-12 PROCEDURE — 88305 TISSUE EXAM BY PATHOLOGIST: CPT | Performed by: PATHOLOGY

## 2018-09-12 RX ORDER — PROPOFOL 10 MG/ML
INJECTION, EMULSION INTRAVENOUS AS NEEDED
Status: DISCONTINUED | OUTPATIENT
Start: 2018-09-12 | End: 2018-09-12 | Stop reason: SURG

## 2018-09-12 RX ORDER — SODIUM CHLORIDE 9 MG/ML
125 INJECTION, SOLUTION INTRAVENOUS CONTINUOUS
Status: DISCONTINUED | OUTPATIENT
Start: 2018-09-12 | End: 2018-09-12 | Stop reason: HOSPADM

## 2018-09-12 RX ADMIN — PROPOFOL 80 MG: 10 INJECTION, EMULSION INTRAVENOUS at 08:28

## 2018-09-12 RX ADMIN — SODIUM CHLORIDE 125 ML/HR: 0.9 INJECTION, SOLUTION INTRAVENOUS at 07:10

## 2018-09-12 RX ADMIN — PROPOFOL 100 MG: 10 INJECTION, EMULSION INTRAVENOUS at 08:30

## 2018-09-12 RX ADMIN — LIDOCAINE HYDROCHLORIDE 60 MG: 20 INJECTION, SOLUTION INTRAVENOUS at 08:28

## 2018-09-12 RX ADMIN — PROPOFOL 20 MG: 10 INJECTION, EMULSION INTRAVENOUS at 08:31

## 2018-09-12 NOTE — PROGRESS NOTES
D/C instructions given and pt verbalizes understanding  OOB to ambulate  Gait steady denies dizziness  DR Mike Red was here evelia talk with Pt

## 2018-09-12 NOTE — OP NOTE
[facility]   [address] [facility], [address]  [phone] (Fax)      Operative Report  ESOPHAGOGASTRODUODENOSCOPY (EGD)     Patient Name: Wilber Hu    :  1975  MRN: [id number]  Patient Location: [facility] ROOM 01  Surgery Date : 2018  Surgeons:  Surgeon(s) and Role:     * Marcelino Man MD - Primary    Diagnosis:    Pre-Op Diagnosis Codes: Morbid obesity ([facility] [address] ) [E66 01]  Body mass index is 45 35 kg/m²  Post-Op Diagnosis Codes:     * Morbid obesity ([facility] [address] ) [E66 01]     * Body mass index is 45 35 kg/m²  * Gastritis    Procedure  1  Endoscopy with Biopsy    Specimen(s):  ID Type Source Tests Collected by Time Destination   1 : gastric antrum bx, R/O H  Pylori Tissue Stomach TISSUE EXAM Marcelino Man MD 2018 0831        Estimated Blood Loss:    Minimal      Anesthesia Type:     IV Sedation with Anesthesia    Operative Indications: Morbid obesity ([facility] [address] [address] ) [E66 01]  Body mass index is 45 35 kg/m²  Operative Findings:    Gastritis    Complications:     None    Procedure and Technique:       Indication for the procedure     The patient is a 37 y o  female with a long standing history of morbid obesity who presented to the office for a bariatric operation  The risks, benefits and alternatives to the procedure were discussed with the patient and informed consent was obtained for an upper endoscopy and biopsy to asses the anatomy of the GI tract prior to the surgical procedure      Operative Technique     The patient was brought to the GI suite and was placed in a supine position  EKG trace, pulse, blood pressure and oxygen saturation were monitored throughout the procedure  A time out was called and the patient was identified by name and arm band  The patient was placed in a left lateral decubitus position and received IV sedation with propofol by the anesthesia staff    The endoscope was introduced through the mouth and advanced under under direct visualization  The esophagus was normal in appearance  The stomach showed evidence of  inflammation  There were no mucosal lesions, polyps nor ulcerations    The first and second portions of the duodenum were inspected and were normal in appearance  A biopsy was taken from the antrum times two with a forceps grasper to rule out the presence of H  Pylori  A retroflex view of the GE junction was obtained and was normal in appearance     The GE junction was again inspected upon withdrawing the scope and was normal in appearance      Impression     Gastritis        Patient Disposition:    PACU     Signature: Domenic White MD  Date: September 12, 2018  Time: 8:33 AM

## 2018-09-12 NOTE — ANESTHESIA PREPROCEDURE EVALUATION
Review of Systems/Medical History  Patient summary reviewed  Chart reviewed  No history of anesthetic complications     Cardiovascular  Hyperlipidemia,    Pulmonary  Asthma , well controlled/ stable ,        GI/Hepatic  Negative GI/hepatic ROS               Endo/Other  History of thyroid disease , hypothyroidism,   Obesity  morbid obesity   GYN  Negative gynecology ROS          Hematology  Negative hematology ROS      Musculoskeletal  Negative musculoskeletal ROS        Neurology  Negative neurology ROS      Psychology   Anxiety,              Physical Exam    Airway    Mallampati score: II  TM Distance: >3 FB  Neck ROM: full     Dental   No notable dental hx     Cardiovascular  Rhythm: regular, Rate: normal, Cardiovascular exam normal    Pulmonary  Pulmonary exam normal Breath sounds clear to auscultation,     Other Findings        Anesthesia Plan  ASA Score- 3     Anesthesia Type- general with ASA Monitors  Additional Monitors:   Airway Plan:         Plan Factors-    Induction- intravenous  Postoperative Plan-     Informed Consent- Anesthetic plan and risks discussed with patient  I personally reviewed this patient with the CRNA  Discussed and agreed on the Anesthesia Plan with the CRNA  Arnol Ramon

## 2018-09-12 NOTE — DISCHARGE INSTRUCTIONS
Gastritis   WHAT YOU NEED TO KNOW:   Gastritis is inflammation or irritation of the lining of your stomach  DISCHARGE INSTRUCTIONS:   Call 911 for any of the following:   · You develop chest pain or shortness of breath  Seek care immediately if:   · You vomit blood  · You have black or bloody bowel movements  · You have severe stomach or back pain  Contact your healthcare provider if:   · You have a fever  · You have new or worsening symptoms, even after treatment  · You have questions or concerns about your condition or care  Medicines:   · Medicines  may be given to help treat a bacterial infection or decrease stomach acid  · Take your medicine as directed  Contact your healthcare provider if you think your medicine is not helping or if you have side effects  Tell him or her if you are allergic to any medicine  Keep a list of the medicines, vitamins, and herbs you take  Include the amounts, and when and why you take them  Bring the list or the pill bottles to follow-up visits  Carry your medicine list with you in case of an emergency  Manage or prevent gastritis:   · Do not smoke  Nicotine and other chemicals in cigarettes and cigars can make your symptoms worse and cause lung damage  Ask your healthcare provider for information if you currently smoke and need help to quit  E-cigarettes or smokeless tobacco still contain nicotine  Talk to your healthcare provider before you use these products  · Do not drink alcohol  Alcohol can prevent healing and make your gastritis worse  Talk to your healthcare provider if you need help to stop drinking  · Do not take NSAIDs or aspirin unless directed  These and similar medicines can cause irritation  If your healthcare provider says it is okay to take NSAIDs, take them with food  · Do not eat foods that cause irritation  Foods such as oranges and salsa can cause burning or pain  Eat a variety of healthy foods   Examples include fruits (not citrus), vegetables, low-fat dairy products, beans, whole-grain breads, and lean meats and fish  Try to eat small meals, and drink water with your meals  Do not eat for at least 3 hours before you go to bed  · Find ways to relax and decrease stress  Stress can increase stomach acid and make gastritis worse  Activities such as yoga, meditation, or listening to music can help you relax  Spend time with friends, or do things you enjoy  Follow up with your healthcare provider as directed: You may need ongoing tests or treatment, or referral to a gastroenterologist  Write down your questions so you remember to ask them during your visits  © 2017 [employer] Information is for End User's use only and may not be sold, redistributed or otherwise used for commercial purposes  All illustrations and images included in CareNotes® are the copyrighted property of [employer]  or [employer]  The above information is an  only  It is not intended as medical advice for individual conditions or treatments  Talk to your doctor, nurse or pharmacist before following any medical regimen to see if it is safe and effective for you  Upper Endoscopy   WHAT YOU NEED TO KNOW:   An upper endoscopy is also called an upper gastrointestinal (GI) endoscopy, or an esophagogastroduodenoscopy (EGD)  You may feel bloated, gassy, or have some abdominal discomfort after your procedure  Your throat may be sore for 24 to 36 hours  You may burp or pass gas from air that is still inside your body  DISCHARGE INSTRUCTIONS:   Call 911 for any of the following:   · You have sudden chest pain or trouble breathing  Seek care immediately if:   · You feel dizzy or faint  · You have trouble swallowing  · Your bowel movements are very dark or black  · Your abdomen is hard and firm and you have severe pain  · You vomit blood    Contact your healthcare provider if:   · You feel full or bloated and cannot burp or pass gas  · You have not had a bowel movement for 3 days after your procedure  · You have neck pain  · You have a fever or chills  · You have nausea or are vomiting  · You have a rash or hives  · You have questions or concerns about your endoscopy  Relieve a sore throat:  Suck on throat lozenges or crushed ice  Gargle with a small amount of warm salt water  Mix 1 teaspoon of salt and 1 cup of warm water to make salt water  Relieve gas and discomfort from bloating:  Lie on your right side with a heating pad on your abdomen  Take short walks to help pass gas  Eat small meals until bloating is relieved  Rest after your procedure: You have been given medicine to relax you  Do not  drive or make important decisions until the day after your procedure  Return to your normal activity as directed  You can usually return to work the day after your procedure  Follow up with your healthcare provider as directed:  Write down your questions so you remember to ask them during your visits  © 2017 [employer] is for End User's use only and may not be sold, redistributed or otherwise used for commercial purposes  All illustrations and images included in CareNotes® are the copyrighted property of [employer]  or [employer]  The above information is an  only  It is not intended as medical advice for individual conditions or treatments  Talk to your doctor, nurse or pharmacist before following any medical regimen to see if it is safe and effective for you

## 2018-09-12 NOTE — H&P (VIEW-ONLY)
BARIATRIC INITIAL CONSULT - BARIATRIC SURGERY    Regine Chris 37 y o  female MRN: [id number]  Unit/Bed#:  Encounter: [id number]      HPI:  Regine Chris is a 37 y o  female who presents with a longstanding history of morbid obesity and inability to sustain a meaningful weight loss  Here today to discuss bariatric options  Visit type: initial visit    Symptoms: inability to loss weight    Associated Symptoms: anxiety    Associated Conditions: hyperlipidemia and abdominal obesity  Disease Complications: none  Weight Loss Interest: high  Previous Diet Trials: low calorie     Exercise Frequency:infrequency  Types of Exercise: walking        Review of Systems   All other systems reviewed and are negative  Historical Information   Past Medical History:   Diagnosis Date    Asthma     Disease of thyroid gland     Morbid obesity ([address] )     Psoriasis     Skin disorder      Past Surgical History:   Procedure Laterality Date     SECTION      KNEE SURGERY       Social History   History   Alcohol Use    Yes     Comment: social      History   Drug Use No     History   Smoking Status    Never Smoker   Smokeless Tobacco    Never Used     Family History: non-contributory    Meds/Allergies   all medications and allergies reviewed  No Known Allergies    Objective       Current Vitals:   Blood Pressure: 122/86 (18)  Pulse: 84 (18)  Temperature: 97 6 °F (36 4 °C) (18)  Temp Source: Tympanic (18)  Height: 5' 1" (154 9 cm) (18)  Weight - Scale: 119 kg (263 lb) (18)        Invasive Devices          No matching active lines, drains, or airways          Physical Exam   Constitutional: She is oriented to person, place, and time  She appears well-developed and well-nourished  No distress  Neurological: She is alert and oriented to person, place, and time  Psychiatric: She has a normal mood and affect   Her behavior is normal  Judgment and thought content normal    Vitals reviewed  Lab Results: I have personally reviewed pertinent lab results  Imaging: I have personally reviewed pertinent reports  EKG, Pathology, and Other Studies: I have personally reviewed pertinent reports  Assessment/PLAN:    37 y o  yo female with a long standing h/o of obesity and inability to sustain any meaningful weight loss on her own despite several attempts  She is interested in the Laparoscopic gastric bypass or sleeve gastrectomy  As a part of her pre op evaluation, she will be referred to a cardiologist and for a sleep evaluation and consult  She needs an EGD to evaluate the anatomy of her GI tract prior to the operation  I have spent over 45 minutes with her face to face in the office today discussing her options and details of the surgery  We have seen an animation of the surgery on the computer that illustrates how the operation is done and how the anatomy will be altered with the procedure  Over 50% of this was coordinating care  She was given the opportunity to ask questions and I have answered all of them  I have discussed and educated the patient with regards to the components of our multidisciplinary program and the importance of compliance and follow up in the post operative period  The patient was also instructed with regards to the importance of behavior modification, nutritional counseling, support meeting attendance and lifestyle changes that are important to ensure success  Although there is a great statistical chance of improvement or even resolution of most of her associated comorbidities, the results vary from patient to patient and they largely depend on her commitment and compliance  She needs to lose 13 lbs prior to the operation        Rafa Moya MD  8/31/2018  9:45 AM

## 2019-05-03 ENCOUNTER — TELEPHONE (OUTPATIENT)
Dept: BARIATRICS | Facility: CLINIC | Age: 44
End: 2019-05-03

## 2019-06-19 ENCOUNTER — TELEPHONE (OUTPATIENT)
Dept: BARIATRICS | Facility: CLINIC | Age: 44
End: 2019-06-19

## 2019-06-24 ENCOUNTER — CLINICAL SUPPORT (OUTPATIENT)
Dept: BARIATRICS | Facility: CLINIC | Age: 44
End: 2019-06-24

## 2019-06-24 VITALS
DIASTOLIC BLOOD PRESSURE: 74 MMHG | HEART RATE: 82 BPM | WEIGHT: 263 LBS | HEIGHT: 61 IN | SYSTOLIC BLOOD PRESSURE: 124 MMHG | TEMPERATURE: 97.5 F | BODY MASS INDEX: 49.65 KG/M2

## 2019-06-24 DIAGNOSIS — E66.01 MORBID OBESITY (HCC): ICD-10-CM

## 2019-06-24 DIAGNOSIS — E66.01 MORBID (SEVERE) OBESITY DUE TO EXCESS CALORIES (HCC): Primary | ICD-10-CM

## 2019-06-24 DIAGNOSIS — E66.01 MORBID OBESITY (HCC): Primary | ICD-10-CM

## 2019-06-24 PROCEDURE — RECHECK

## 2019-07-12 ENCOUNTER — APPOINTMENT (OUTPATIENT)
Dept: LAB | Facility: CLINIC | Age: 44
End: 2019-07-12
Payer: COMMERCIAL

## 2019-07-12 ENCOUNTER — TRANSCRIBE ORDERS (OUTPATIENT)
Dept: LAB | Facility: CLINIC | Age: 44
End: 2019-07-12

## 2019-07-12 DIAGNOSIS — Z79.899 ENCOUNTER FOR LONG-TERM (CURRENT) USE OF OTHER MEDICATIONS: Primary | ICD-10-CM

## 2019-07-12 LAB
ALBUMIN SERPL BCP-MCNC: 3.4 G/DL (ref 3.5–5)
ALP SERPL-CCNC: 108 U/L (ref 46–116)
ALT SERPL W P-5'-P-CCNC: 16 U/L (ref 12–78)
ANION GAP SERPL CALCULATED.3IONS-SCNC: 6 MMOL/L (ref 4–13)
AST SERPL W P-5'-P-CCNC: 8 U/L (ref 5–45)
BASOPHILS # BLD AUTO: 0.07 THOUSANDS/ΜL (ref 0–0.1)
BASOPHILS NFR BLD AUTO: 1 % (ref 0–1)
BILIRUB SERPL-MCNC: 0.32 MG/DL (ref 0.2–1)
BUN SERPL-MCNC: 14 MG/DL (ref 5–25)
CALCIUM SERPL-MCNC: 9.1 MG/DL (ref 8.3–10.1)
CHLORIDE SERPL-SCNC: 107 MMOL/L (ref 100–108)
CO2 SERPL-SCNC: 29 MMOL/L (ref 21–32)
CREAT SERPL-MCNC: 0.63 MG/DL (ref 0.6–1.3)
EOSINOPHIL # BLD AUTO: 0.35 THOUSAND/ΜL (ref 0–0.61)
EOSINOPHIL NFR BLD AUTO: 2 % (ref 0–6)
ERYTHROCYTE [DISTWIDTH] IN BLOOD BY AUTOMATED COUNT: 13.5 % (ref 11.6–15.1)
GFR SERPL CREATININE-BSD FRML MDRD: 109 ML/MIN/1.73SQ M
GLUCOSE P FAST SERPL-MCNC: 99 MG/DL (ref 65–99)
HCT VFR BLD AUTO: 38.6 % (ref 34.8–46.1)
HGB BLD-MCNC: 12.2 G/DL (ref 11.5–15.4)
IMM GRANULOCYTES # BLD AUTO: 0.08 THOUSAND/UL (ref 0–0.2)
IMM GRANULOCYTES NFR BLD AUTO: 1 % (ref 0–2)
LYMPHOCYTES # BLD AUTO: 4.57 THOUSANDS/ΜL (ref 0.6–4.47)
LYMPHOCYTES NFR BLD AUTO: 30 % (ref 14–44)
MCH RBC QN AUTO: 28.6 PG (ref 26.8–34.3)
MCHC RBC AUTO-ENTMCNC: 31.6 G/DL (ref 31.4–37.4)
MCV RBC AUTO: 91 FL (ref 82–98)
MONOCYTES # BLD AUTO: 0.99 THOUSAND/ΜL (ref 0.17–1.22)
MONOCYTES NFR BLD AUTO: 6 % (ref 4–12)
NEUTROPHILS # BLD AUTO: 9.41 THOUSANDS/ΜL (ref 1.85–7.62)
NEUTS SEG NFR BLD AUTO: 60 % (ref 43–75)
NRBC BLD AUTO-RTO: 0 /100 WBCS
PLATELET # BLD AUTO: 461 THOUSANDS/UL (ref 149–390)
PMV BLD AUTO: 10.6 FL (ref 8.9–12.7)
POTASSIUM SERPL-SCNC: 3.9 MMOL/L (ref 3.5–5.3)
PROT SERPL-MCNC: 7.5 G/DL (ref 6.4–8.2)
RBC # BLD AUTO: 4.26 MILLION/UL (ref 3.81–5.12)
SODIUM SERPL-SCNC: 142 MMOL/L (ref 136–145)
WBC # BLD AUTO: 15.47 THOUSAND/UL (ref 4.31–10.16)

## 2019-07-12 PROCEDURE — 80053 COMPREHEN METABOLIC PANEL: CPT

## 2019-07-12 PROCEDURE — 85025 COMPLETE CBC W/AUTO DIFF WBC: CPT

## 2019-07-12 PROCEDURE — 86480 TB TEST CELL IMMUN MEASURE: CPT

## 2019-07-12 PROCEDURE — 36415 COLL VENOUS BLD VENIPUNCTURE: CPT

## 2019-07-15 LAB
GAMMA INTERFERON BACKGROUND BLD IA-ACNC: 0.03 IU/ML
M TB IFN-G BLD-IMP: NEGATIVE
M TB IFN-G CD4+ BCKGRND COR BLD-ACNC: -0.01 IU/ML
M TB IFN-G CD4+ BCKGRND COR BLD-ACNC: -0.01 IU/ML
MITOGEN IGNF BCKGRD COR BLD-ACNC: >10 IU/ML

## 2019-09-03 ENCOUNTER — TELEPHONE (OUTPATIENT)
Dept: BARIATRICS | Facility: CLINIC | Age: 44
End: 2019-09-03

## 2019-09-03 NOTE — TELEPHONE ENCOUNTER
I called left message for patient to call me with information of her insurance changing to [employer] as of 9/1  She was to call to verify if bariatric surgery was covered on plan if not account will need to be halted at this time

## 2019-10-10 ENCOUNTER — OFFICE VISIT (OUTPATIENT)
Dept: BARIATRICS | Facility: CLINIC | Age: 44
End: 2019-10-10
Payer: COMMERCIAL

## 2019-10-10 VITALS
HEIGHT: 61 IN | WEIGHT: 263 LBS | RESPIRATION RATE: 18 BRPM | BODY MASS INDEX: 49.65 KG/M2 | DIASTOLIC BLOOD PRESSURE: 84 MMHG | SYSTOLIC BLOOD PRESSURE: 128 MMHG | HEART RATE: 93 BPM | TEMPERATURE: 98.3 F

## 2019-10-10 DIAGNOSIS — E78.00 PURE HYPERCHOLESTEROLEMIA: ICD-10-CM

## 2019-10-10 DIAGNOSIS — E03.9 ACQUIRED HYPOTHYROIDISM: ICD-10-CM

## 2019-10-10 DIAGNOSIS — E66.01 MORBID OBESITY (HCC): Primary | ICD-10-CM

## 2019-10-10 PROCEDURE — 99214 OFFICE O/P EST MOD 30 MIN: CPT | Performed by: SURGERY

## 2019-10-10 NOTE — LETTER
October 10, 2019     Catrachito Nielson, [address]    Patient: Quita Saravia   YOB: 1975   Date of Visit: 10/10/2019       Dear Dr Army Wan: Thank you for referring Quita Saravia to me for evaluation  Below are my notes for this consultation  If you have questions, please do not hesitate to call me  I look forward to following your patient along with you  Sincerely,        Poonam Virgen MD        CC: No Recipients  Poonam Virgen MD  10/10/2019  4:40 PM  Sign at close encounter      [address] 40 y o  female MRN: [id number]  Unit/Bed#:  Encounter: [id number]      HPI:  Quita Saravia is a 40 y o  female who presents with a longstanding history of morbid obesity and inability to sustain a meaningful weight loss  Here today to discuss bariatric options  Visit type: initial visit    Symptoms: excess weight and inability to loss weight    Associated Symptoms: depressed mood and anxiety    Associated Conditions: abdominal obesity  Disease Complications: Hypercholesterolemia  Weight Loss Interest: high  Previous Diet Trials: low calorie     Exercise Frequency:infrequency  Types of Exercise: walking        Review of Systems   All other systems reviewed and are negative  Historical Information   Past Medical History:   Diagnosis Date    Anxiety     Asthma     Disease of thyroid gland     hypo    Morbid obesity ([address] )     Psoriasis     Skin disorder      Past Surgical History:   Procedure Laterality Date     SECTION   &     2    KNEE SURGERY      [place] EGD TRANSORAL BIOPSY SINGLE/MULTIPLE N/A 2018    Procedure: ESOPHAGOGASTRODUODENOSCOPY (EGD) with bx;  Surgeon: Poonam Virgen MD;  Location: [facility];   Service: Bariatrics     Social History   Social History     Substance and Sexual Activity   Alcohol Use Yes    Frequency: Monthly or less Comment: occasional     Social History     Substance and Sexual Activity   Drug Use No     Social History     Tobacco Use   Smoking Status Never Smoker   Smokeless Tobacco Never Used     Family History: non-contributory    Meds/Allergies   all medications and allergies reviewed  No Known Allergies    Objective       Current Vitals:   Blood Pressure: 128/84 (10/10/19 1621)  Pulse: 93 (10/10/19 1621)  Temperature: 98 3 °F (36 8 °C) (10/10/19 1621)  Temp Source: Axillary (10/10/19 1621)  Respirations: 18 (10/10/19 1621)  Height: 5' 0 7" (154 2 cm) (10/10/19 1621)  Weight - Scale: 119 kg (263 lb) (10/10/19 1621)        Invasive Devices     None                 Physical Exam   Constitutional: She is oriented to person, place, and time  She appears well-developed and well-nourished  No distress  HENT:   Head: Normocephalic and atraumatic  Right Ear: External ear normal    Left Ear: External ear normal    Nose: Nose normal    Eyes: Conjunctivae are normal  Right eye exhibits no discharge  Left eye exhibits no discharge  No scleral icterus  Neurological: She is alert and oriented to person, place, and time  Skin: She is not diaphoretic  Psychiatric: She has a normal mood and affect  Her behavior is normal  Judgment and thought content normal    Vitals reviewed  Lab Results: I have personally reviewed pertinent lab results  Imaging: I have personally reviewed pertinent reports  EKG, Pathology, and Other Studies: I have personally reviewed pertinent reports  She had an endoscopy done in September of last year  This study revealed gastritis and biopsies showed  A  Gastric biopsies:  - Gastric body-type mucosa showing no chronic gastritis, acute/active gastritis, intestinal metaplasia, nor other abnormalities  - Negative for Helicobacter organisms on IHC stain      Assessment/PLAN:    40 y o  yo female with a long standing h/o of obesity and inability to sustain any meaningful weight loss on her own despite several attempts  She is interested in the Laparoscopic Vadim-en-Y gastric bypass  As a part of her pre op evaluation, she will be referred to a cardiologist and for a sleep evaluation and consult  She needs an EGD to evaluate the anatomy of her GI tract prior to the operation  I have spent over 45 minutes with her face to face in the office today discussing her options and details of the surgery  We have seen an animation of the surgery on the computer that illustrates how the operation is done and how the anatomy will be altered with the procedure  Over 50% of this was coordinating care  She was given the opportunity to ask questions and I have answered all of them  I have discussed and educated the patient with regards to the components of our multidisciplinary program and the importance of compliance and follow up in the post operative period  The patient was also instructed with regards to the importance of behavior modification, nutritional counseling, support meeting attendance and lifestyle changes that are important to ensure success  Although there is a great statistical chance of improvement or even resolution of most of her associated comorbidities, the results vary from patient to patient and they largely depend on her commitment and compliance  She needs to lose  13 lbs prior to the operation          Jalyn Wesley MD  10/10/2019  4:30 PM

## 2019-10-10 NOTE — PROGRESS NOTES
BARIATRIC INITIAL CONSULT - BARIATRIC SURGERY    Myah Thomas 40 y o  female MRN: [id number]  Unit/Bed#:  Encounter: [id number]      HPI:  Myah Thomas is a 40 y o  female who presents with a longstanding history of morbid obesity and inability to sustain a meaningful weight loss  Here today to discuss bariatric options  Visit type: initial visit    Symptoms: excess weight and inability to loss weight    Associated Symptoms: depressed mood and anxiety    Associated Conditions: abdominal obesity  Disease Complications: Hypercholesterolemia  Weight Loss Interest: high  Previous Diet Trials: low calorie     Exercise Frequency:infrequency  Types of Exercise: walking        Review of Systems   All other systems reviewed and are negative  Historical Information   Past Medical History:   Diagnosis Date    Anxiety     Asthma     Disease of thyroid gland     hypo    Morbid obesity ([address] )     Psoriasis     Skin disorder      Past Surgical History:   Procedure Laterality Date     SECTION   &     2    KNEE SURGERY      [place] EGD TRANSORAL BIOPSY SINGLE/MULTIPLE N/A 2018    Procedure: ESOPHAGOGASTRODUODENOSCOPY (EGD) with bx;  Surgeon: Tony Artis MD;  Location: [facility];   Service: Bariatrics     Social History   Social History     Substance and Sexual Activity   Alcohol Use Yes    Frequency: Monthly or less    Comment: occasional     Social History     Substance and Sexual Activity   Drug Use No     Social History     Tobacco Use   Smoking Status Never Smoker   Smokeless Tobacco Never Used     Family History: non-contributory    Meds/Allergies   all medications and allergies reviewed  No Known Allergies    Objective       Current Vitals:   Blood Pressure: 128/84 (10/10/19 1621)  Pulse: 93 (10/10/19 1621)  Temperature: 98 3 °F (36 8 °C) (10/10/19 1621)  Temp Source: Axillary (10/10/19 1621)  Respirations: 18 (10/10/19 1621)  Height: 5' 0 7" (154 2 cm) (10/10/19 1621)  Weight - Scale: 119 kg (263 lb) (10/10/19 1621)        Invasive Devices     None                 Physical Exam   Constitutional: She is oriented to person, place, and time  She appears well-developed and well-nourished  No distress  HENT:   Head: Normocephalic and atraumatic  Right Ear: External ear normal    Left Ear: External ear normal    Nose: Nose normal    Eyes: Conjunctivae are normal  Right eye exhibits no discharge  Left eye exhibits no discharge  No scleral icterus  Neurological: She is alert and oriented to person, place, and time  Skin: She is not diaphoretic  Psychiatric: She has a normal mood and affect  Her behavior is normal  Judgment and thought content normal    Vitals reviewed  Lab Results: I have personally reviewed pertinent lab results  Imaging: I have personally reviewed pertinent reports  EKG, Pathology, and Other Studies: I have personally reviewed pertinent reports  She had an endoscopy done in September of last year  This study revealed gastritis and biopsies showed  A  Gastric biopsies:  - Gastric body-type mucosa showing no chronic gastritis, acute/active gastritis, intestinal metaplasia, nor other abnormalities  - Negative for Helicobacter organisms on IHC stain  Assessment/PLAN:    40 y o  yo female with a long standing h/o of obesity and inability to sustain any meaningful weight loss on her own despite several attempts  I originally saw her about a year ago but there was some insurance issues that post her surgery to a later date  She is interested in the Laparoscopic Vadim-en-Y gastric bypass  As a part of her pre op evaluation, she will be referred to a cardiologist and for a sleep evaluation and consult  She needs an EGD to evaluate the anatomy of her GI tract prior to the operation  I have spent over 45 minutes with her face to face in the office today discussing her options and details of the surgery   We have seen an animation of the surgery on the computer that illustrates how the operation is done and how the anatomy will be altered with the procedure  Over 50% of this was coordinating care  She was given the opportunity to ask questions and I have answered all of them  I have discussed and educated the patient with regards to the components of our multidisciplinary program and the importance of compliance and follow up in the post operative period  The patient was also instructed with regards to the importance of behavior modification, nutritional counseling, support meeting attendance and lifestyle changes that are important to ensure success  Although there is a great statistical chance of improvement or even resolution of most of her associated comorbidities, the results vary from patient to patient and they largely depend on her commitment and compliance  She needs to lose  13 lbs prior to the operation        Rafaela Bowman MD  10/10/2019  4:30 PM

## 2019-10-15 DIAGNOSIS — L40.9 PSORIASIS: ICD-10-CM

## 2019-10-15 RX ORDER — CLOBETASOL PROPIONATE 0.5 MG/G
CREAM TOPICAL 2 TIMES DAILY
Qty: 60 G | Refills: 3 | Status: SHIPPED | OUTPATIENT
Start: 2019-10-15

## 2019-10-16 ENCOUNTER — OFFICE VISIT (OUTPATIENT)
Dept: FAMILY MEDICINE CLINIC | Facility: CLINIC | Age: 44
End: 2019-10-16
Payer: COMMERCIAL

## 2019-10-16 VITALS
SYSTOLIC BLOOD PRESSURE: 122 MMHG | DIASTOLIC BLOOD PRESSURE: 80 MMHG | HEART RATE: 84 BPM | HEIGHT: 60 IN | WEIGHT: 260.4 LBS | BODY MASS INDEX: 51.13 KG/M2

## 2019-10-16 DIAGNOSIS — Z12.39 SCREENING FOR BREAST CANCER: ICD-10-CM

## 2019-10-16 DIAGNOSIS — E78.00 PURE HYPERCHOLESTEROLEMIA: ICD-10-CM

## 2019-10-16 DIAGNOSIS — D72.829 LEUKOCYTOSIS, UNSPECIFIED TYPE: ICD-10-CM

## 2019-10-16 DIAGNOSIS — L40.9 PSORIASIS: ICD-10-CM

## 2019-10-16 DIAGNOSIS — E66.01 MORBID (SEVERE) OBESITY DUE TO EXCESS CALORIES (HCC): Primary | ICD-10-CM

## 2019-10-16 PROCEDURE — 99213 OFFICE O/P EST LOW 20 MIN: CPT | Performed by: NURSE PRACTITIONER

## 2019-10-16 PROCEDURE — 3008F BODY MASS INDEX DOCD: CPT | Performed by: NURSE PRACTITIONER

## 2019-10-16 NOTE — LETTER
October 16, 2019     Patient: Suellen Garcia   YOB: 1975   Date of Visit: 10/16/2019       To Whom it May Concern:    Suellen Garcia is under my professional care  She was seen in my office on 10/16/2019  I agree with patient to have bariatric surgery  I believe this patient is a good candidate to help her with her chronic conditions and to improve quality of life  If you have any questions or concerns, please don't hesitate to call           Sincerely,          AMALIA Nguyen        CC: No Recipients

## 2019-10-16 NOTE — PROGRESS NOTES
Assessment and Plan:    Problem List Items Addressed This Visit        Musculoskeletal and Integument    Psoriasis       Other    Pure hypercholesterolemia    Morbid (severe) obesity due to excess calories (HCC) - Primary    Leukocytosis     I believe patient elevated WBC counts are in reaction to her psoriasis and that she in a biologic  I have informed her that we will check her cbc with blood work that dr Manish Walker has ordered  When time comes for surgery she may need hematology consult if that pattern for WBC persist             Other Visit Diagnoses     Screening for breast cancer        Relevant Orders    Mammo screening bilateral w 3d & cad                 Diagnoses and all orders for this visit:    Morbid (severe) obesity due to excess calories ([address] )    Psoriasis    Pure hypercholesterolemia    Leukocytosis, unspecified type    Screening for breast cancer  -     Mammo screening bilateral w 3d & cad; Future              Subjective:      Patient ID: Peter Ochoa is a 40 y o  female  CC:    Chief Complaint   Patient presents with    Follow-up     Pt here for f/u to lab results, Discuss weight loss  HPI:    Patient states she is going through her weight loss surgery and she needs a recommendation from her PCP  Patient states that her wbc were really high during her flare up of psoriasis  Patient states after this flare  She is on otezla for this  The following portions of the patient's history were reviewed and updated as appropriate: allergies, current medications, past family history, past medical history, past social history, past surgical history and problem list       Review of Systems   Constitutional: Negative for diaphoresis, fatigue and fever  Respiratory: Negative for chest tightness and shortness of breath  Cardiovascular: Negative for chest pain and palpitations  Gastrointestinal: Negative for abdominal pain  Genitourinary: Negative for difficulty urinating  Musculoskeletal: Positive for arthralgias  Skin: Positive for rash  Hematological: Negative for adenopathy  Psychiatric/Behavioral: Negative for dysphoric mood, sleep disturbance and suicidal ideas  The patient is not nervous/anxious  Data to review:       Objective:    Vitals:    10/16/19 1721   BP: 122/80   Pulse: 84   Weight: 118 kg (260 lb 6 4 oz)   Height: 5' (1 524 m)        Physical Exam   Constitutional: She is oriented to person, place, and time  She appears well-developed and well-nourished  HENT:   Head: Normocephalic and atraumatic  Cardiovascular: Normal rate, regular rhythm and normal heart sounds  Pulmonary/Chest: Effort normal and breath sounds normal    Lymphadenopathy:        Head (right side): No submental, no submandibular, no posterior auricular and no occipital adenopathy present  Head (left side): No submental, no submandibular, no posterior auricular and no occipital adenopathy present  She has no cervical adenopathy  Right cervical: No superficial cervical, no deep cervical and no posterior cervical adenopathy present  Left cervical: No superficial cervical, no deep cervical and no posterior cervical adenopathy present  Neurological: She is alert and oriented to person, place, and time  Nursing note and vitals reviewed

## 2019-10-16 NOTE — ASSESSMENT & PLAN NOTE
I believe patient elevated WBC counts are in reaction to her psoriasis and that she in a biologic  I have informed her that we will check her cbc with blood work that dr Ash Both has ordered   When time comes for surgery she may need hematology consult if that pattern for WBC persist

## 2019-11-11 ENCOUNTER — CONSULT (OUTPATIENT)
Dept: CARDIOLOGY CLINIC | Facility: CLINIC | Age: 44
End: 2019-11-11
Payer: COMMERCIAL

## 2019-11-11 VITALS
RESPIRATION RATE: 18 BRPM | DIASTOLIC BLOOD PRESSURE: 70 MMHG | HEART RATE: 79 BPM | HEIGHT: 60 IN | BODY MASS INDEX: 50.65 KG/M2 | WEIGHT: 258 LBS | SYSTOLIC BLOOD PRESSURE: 108 MMHG

## 2019-11-11 DIAGNOSIS — Z01.810 PREOP CARDIOVASCULAR EXAM: Primary | ICD-10-CM

## 2019-11-11 DIAGNOSIS — E66.01 MORBID OBESITY (HCC): ICD-10-CM

## 2019-11-11 DIAGNOSIS — E78.2 MIXED HYPERLIPIDEMIA: ICD-10-CM

## 2019-11-11 PROCEDURE — 93000 ELECTROCARDIOGRAM COMPLETE: CPT | Performed by: INTERNAL MEDICINE

## 2019-11-11 PROCEDURE — 99243 OFF/OP CNSLTJ NEW/EST LOW 30: CPT | Performed by: INTERNAL MEDICINE

## 2019-11-11 NOTE — LETTER
November 11, 2019     Anna Baker, [address]    Patient: Balbina Lombardi   YOB: 1975   Date of Visit: 11/11/2019       Dear Dr Claudell Bras: Thank you for referring Balbina Lombardi to me for evaluation  Below are my notes for this consultation  If you have questions, please do not hesitate to call me  I look forward to following your patient along with you  Sincerely,        Heath Devries MD        CC: Rommie Schlatter, MD Tiburcio Repress, MD  11/11/2019  4:29 PM  Sign at close encounter                                            Cardiology Consultation     Balbina Lombardi  [id number]  1975  [address]  [address] [facility] [address]      HPI:  Patient is a 41-year-old female who was sent for preoperative cardiovascular examination prior to bariatric surgery  She has no cardiac history  She denies chest discomfort, shortness of breath, palpitations, leg edema or symptoms of near-syncope/syncope  She is a lifetime nonsmoker  She does have mixed hyperlipidemia  Her hyperlipidemia will most likely improve with weight loss  She has no family history of coronary artery disease  She works as a  at a school  She is occasionally active with the children going on hikes and other activity  She is active at home  1  Preop cardiovascular exam  POCT ECG   2  Morbid obesity ([address] )  Ambulatory referral to Cardiology   3   Mixed hyperlipidemia       Patient Active Problem List   Diagnosis    Mixed hyperlipidemia    Acquired hypothyroidism    Psoriasis    Morbid (severe) obesity due to excess calories (HCC)    Morbid obesity (HCC)    Leukocytosis     Past Medical History:   Diagnosis Date    Anxiety     Asthma     Disease of thyroid gland     hypo    Morbid obesity ([address] )     Psoriasis     Skin disorder      Social History     Socioeconomic History    Marital status: /Civil Union     Spouse name: Not on file    Number of children: 2    Years of education: Not on file    Highest education level: Not on file   Occupational History    Occupation: Employed   Social Needs    Financial resource strain: Not on file    Food insecurity:     Worry: Not on file     Inability: Not on file   [employer] needs:     Medical: Not on file     Non-medical: Not on file   Tobacco Use    Smoking status: Never Smoker    Smokeless tobacco: Never Used   Substance and Sexual Activity    Alcohol use: Yes     Frequency: Monthly or less     Comment: occasional    Drug use: No    Sexual activity: Yes     Partners: Male     Birth control/protection: None   Lifestyle    Physical activity:     Days per week: Not on file     Minutes per session: Not on file    Stress: Not on file   Relationships    Social connections:     Talks on phone: Not on file     Gets together: Not on file     Attends Sikhism service: Not on file     Active member of club or organization: Not on file     Attends meetings of clubs or organizations: Not on file     Relationship status: Not on file    Intimate partner violence:     Fear of current or ex partner: Not on file     Emotionally abused: Not on file     Physically abused: Not on file     Forced sexual activity: Not on file   Other Topics Concern    Not on file   Social History Narrative    Always uses seat belt    [employer]    [address] [place]    Lives with spouse    No advance directives    No caffeine use    No Living will    Supportive and safe              Family History   Problem Relation Age of Onset    No Known Problems Mother     Hypertension Father     Stomach cancer Family     Obesity Family      Past Surgical History:   Procedure Laterality Date     SECTION   &     2    KNEE SURGERY      [place] EGD TRANSORAL BIOPSY SINGLE/MULTIPLE N/A 2018    Procedure: ESOPHAGOGASTRODUODENOSCOPY (EGD) with bx;  Surgeon: Calderon Fisher MD;  Location: [facility]; Service: Bariatrics       Current Outpatient Medications:     Apremilast (OTEZLA PO), Take by mouth, Disp: , Rfl:     clobetasol (TEMOVATE) 0 05 % cream, APPLY TOPICALLY 2 (TWO) TIMES A DAY, Disp: 60 g, Rfl: 3  No Known Allergies  Vitals:    11/11/19 1554   BP: 108/70   Pulse: 79   Resp: 18   Weight: 117 kg (258 lb)   Height: 5' (1 524 m)         Review of Systems:  Review of Systems   Constitutional: Negative  HENT: Negative  Respiratory: Negative for chest tightness and shortness of breath  Cardiovascular: Negative for chest pain and leg swelling  Gastrointestinal: Negative  Endocrine: Negative  Genitourinary: Negative  Musculoskeletal: Negative  Skin: Negative  Allergic/Immunologic: Negative  Neurological: Negative  Hematological: Negative  Psychiatric/Behavioral: Negative  Physical Exam:  Physical Exam   Constitutional: She is oriented to person, place, and time  She appears well-developed and well-nourished  Morbidly obese   HENT:   Head: Normocephalic and atraumatic  Neck: Normal range of motion  Neck supple  No JVD present  No tracheal deviation present  No thyromegaly present  Cardiovascular: Normal rate, regular rhythm, normal heart sounds and intact distal pulses  Exam reveals no gallop and no friction rub  No murmur heard  Pulmonary/Chest: Effort normal  No respiratory distress  She has no rales  Abdominal: Soft  Bowel sounds are normal    Musculoskeletal: Normal range of motion  She exhibits no edema  Neurological: She is alert and oriented to person, place, and time  Skin: Skin is warm and dry  Psychiatric: She has a normal mood and affect  Her behavior is normal        Discussion/Summary:  1  Recommend proceeding with bariatric surgery as planned  2  Patient is not of an increased cardiac risk  3  No cardiac testing necessary

## 2019-11-11 NOTE — PROGRESS NOTES
Cardiology Consultation     Quita Stover  [id number]  1975  [address] [place] [address]      HPI:  Patient is a 51-year-old female who was sent for preoperative cardiovascular examination prior to bariatric surgery  She has no cardiac history  She denies chest discomfort, shortness of breath, palpitations, leg edema or symptoms of near-syncope/syncope  She is a lifetime nonsmoker  She does have mixed hyperlipidemia  Her hyperlipidemia will most likely improve with weight loss  She has no family history of coronary artery disease  She works as a  at a school  She is occasionally active with the children going on [employer] and other activity  She is active at home  1  Preop cardiovascular exam  POCT ECG   2  Morbid obesity ([facility] [address] )  Ambulatory referral to Cardiology   3   Mixed hyperlipidemia       Patient Active Problem List   Diagnosis    Mixed hyperlipidemia    Acquired hypothyroidism    Psoriasis    Morbid (severe) obesity due to excess calories (HCC)    Morbid obesity (HCC)    Leukocytosis     Past Medical History:   Diagnosis Date    Anxiety     Asthma     Disease of thyroid gland     hypo    Morbid obesity ([facility] [address] )     Psoriasis     Skin disorder      Social History     Socioeconomic History    Marital status: /Civil Union     Spouse name: Not on file    Number of children: 2    Years of education: Not on file    Highest education level: Not on file   Occupational History    Occupation: Employed   Social Needs    Financial resource strain: Not on file    Food insecurity:     Worry: Not on file     Inability: Not on file   "[employer] needs:     Medical: Not on file     Non-medical: Not on file   Tobacco Use    Smoking status: Never Smoker    Smokeless tobacco: Never Used   Substance and Sexual Activity    Alcohol use: Yes     Frequency: Monthly or less     Comment: occasional    Drug use: No    Sexual activity: Yes     Partners: Male     Birth control/protection: None   Lifestyle    Physical activity:     Days per week: Not on file     Minutes per session: Not on file    Stress: Not on file   Relationships    Social connections:     Talks on phone: Not on file     Gets together: Not on file     Attends [employer] service: Not on file     Active member of club or organization: Not on file     Attends meetings of clubs or organizations: Not on file     Relationship status: Not on file    Intimate partner violence:     Fear of current or ex partner: Not on file     Emotionally abused: Not on file     Physically abused: Not on file     Forced sexual activity: Not on file   Other Topics Concern    Not on file   Social History Narrative    Always uses seat belt    [employer] ([address])    Lives in private house    Lives with children    Lives with spouse    No advance directives    No caffeine use    No Living will    Supportive and safe              Family History   Problem Relation Age of Onset    No Known Problems Mother     Hypertension Father     Stomach cancer Family     Obesity Family      Past Surgical History:   Procedure Laterality Date     SECTION   &     2    KNEE SURGERY      MD EGD TRANSORAL BIOPSY SINGLE/MULTIPLE N/A 2018    Procedure: ESOPHAGOGASTRODUODENOSCOPY (EGD) with bx;  Surgeon: Dion Lora MD;  Location: [facility]; Service: Bariatrics       Current Outpatient Medications:     Apremilast (OTEZLA PO), Take by mouth, Disp: , Rfl:     clobetasol (TEMOVATE) 0 05 % cream, APPLY TOPICALLY 2 (TWO) TIMES A DAY, Disp: 60 g, Rfl: 3  No Known Allergies  Vitals:    19 1554   BP: 108/70   Pulse: 79   Resp: 18   Weight: 117 kg (258 lb)   Height: 5' (1 524 m)         Review of Systems:  Review of Systems   Constitutional: Negative  HENT: Negative      Respiratory: Negative for chest tightness and shortness of breath  Cardiovascular: Negative for chest pain and leg swelling  Gastrointestinal: Negative  Endocrine: Negative  Genitourinary: Negative  Musculoskeletal: Negative  Skin: Negative  Allergic/Immunologic: Negative  Neurological: Negative  Hematological: Negative  Psychiatric/Behavioral: Negative  Physical Exam:  Physical Exam   Constitutional: She is oriented to person, place, and time  She appears well-developed and well-nourished  Morbidly obese   HENT:   Head: Normocephalic and atraumatic  Neck: Normal range of motion  Neck supple  No JVD present  No tracheal deviation present  No thyromegaly present  Cardiovascular: Normal rate, regular rhythm, normal heart sounds and intact distal pulses  Exam reveals no gallop and no friction rub  No murmur heard  Pulmonary/Chest: Effort normal  No respiratory distress  She has no rales  Abdominal: Soft  Bowel sounds are normal    Musculoskeletal: Normal range of motion  She exhibits no edema  Neurological: She is alert and oriented to person, place, and time  Skin: Skin is warm and dry  Psychiatric: She has a normal mood and affect  Her behavior is normal        Discussion/Summary:  1  Recommend proceeding with bariatric surgery as planned  2  Patient is not of an increased cardiac risk  3  No cardiac testing necessary

## 2019-11-12 ENCOUNTER — APPOINTMENT (OUTPATIENT)
Dept: LAB | Facility: CLINIC | Age: 44
End: 2019-11-12
Payer: COMMERCIAL

## 2019-11-12 DIAGNOSIS — E78.00 PURE HYPERCHOLESTEROLEMIA: ICD-10-CM

## 2019-11-12 DIAGNOSIS — E66.01 MORBID OBESITY (HCC): ICD-10-CM

## 2019-11-12 DIAGNOSIS — E03.9 ACQUIRED HYPOTHYROIDISM: ICD-10-CM

## 2019-11-12 LAB
ALBUMIN SERPL BCP-MCNC: 3.1 G/DL (ref 3.5–5)
ALP SERPL-CCNC: 94 U/L (ref 46–116)
ALT SERPL W P-5'-P-CCNC: 12 U/L (ref 12–78)
ANION GAP SERPL CALCULATED.3IONS-SCNC: 6 MMOL/L (ref 4–13)
AST SERPL W P-5'-P-CCNC: 9 U/L (ref 5–45)
BILIRUB SERPL-MCNC: 0.37 MG/DL (ref 0.2–1)
BUN SERPL-MCNC: 12 MG/DL (ref 5–25)
CALCIUM SERPL-MCNC: 8.8 MG/DL (ref 8.3–10.1)
CHLORIDE SERPL-SCNC: 108 MMOL/L (ref 100–108)
CHOLEST SERPL-MCNC: 269 MG/DL (ref 50–200)
CO2 SERPL-SCNC: 25 MMOL/L (ref 21–32)
CREAT SERPL-MCNC: 0.66 MG/DL (ref 0.6–1.3)
ERYTHROCYTE [DISTWIDTH] IN BLOOD BY AUTOMATED COUNT: 13.6 % (ref 11.6–15.1)
GFR SERPL CREATININE-BSD FRML MDRD: 108 ML/MIN/1.73SQ M
GLUCOSE P FAST SERPL-MCNC: 109 MG/DL (ref 65–99)
HCT VFR BLD AUTO: 39 % (ref 34.8–46.1)
HDLC SERPL-MCNC: 54 MG/DL
HGB BLD-MCNC: 12.3 G/DL (ref 11.5–15.4)
LDLC SERPL CALC-MCNC: 174 MG/DL (ref 0–100)
MCH RBC QN AUTO: 28.3 PG (ref 26.8–34.3)
MCHC RBC AUTO-ENTMCNC: 31.5 G/DL (ref 31.4–37.4)
MCV RBC AUTO: 90 FL (ref 82–98)
NONHDLC SERPL-MCNC: 215 MG/DL
PLATELET # BLD AUTO: 488 THOUSANDS/UL (ref 149–390)
PMV BLD AUTO: 10.1 FL (ref 8.9–12.7)
POTASSIUM SERPL-SCNC: 3.7 MMOL/L (ref 3.5–5.3)
PROT SERPL-MCNC: 7.4 G/DL (ref 6.4–8.2)
RBC # BLD AUTO: 4.34 MILLION/UL (ref 3.81–5.12)
SODIUM SERPL-SCNC: 139 MMOL/L (ref 136–145)
T4 FREE SERPL-MCNC: 0.86 NG/DL (ref 0.76–1.46)
TRIGL SERPL-MCNC: 203 MG/DL
TSH SERPL DL<=0.05 MIU/L-ACNC: 8.15 UIU/ML (ref 0.36–3.74)
WBC # BLD AUTO: 16.79 THOUSAND/UL (ref 4.31–10.16)

## 2019-11-12 PROCEDURE — 80053 COMPREHEN METABOLIC PANEL: CPT

## 2019-11-12 PROCEDURE — 84439 ASSAY OF FREE THYROXINE: CPT

## 2019-11-12 PROCEDURE — 84443 ASSAY THYROID STIM HORMONE: CPT

## 2019-11-12 PROCEDURE — 85027 COMPLETE CBC AUTOMATED: CPT

## 2019-11-12 PROCEDURE — 36415 COLL VENOUS BLD VENIPUNCTURE: CPT

## 2019-11-12 PROCEDURE — 80061 LIPID PANEL: CPT

## 2019-11-14 ENCOUNTER — TELEPHONE (OUTPATIENT)
Dept: BARIATRICS | Facility: CLINIC | Age: 44
End: 2019-11-14

## 2019-11-14 DIAGNOSIS — R79.89 ABNORMAL TSH: Primary | ICD-10-CM

## 2019-11-14 NOTE — TELEPHONE ENCOUNTER
RD called patient to review abnormal TSH level  RD reviewed level with patient and provided recommendation for patient to follow up with either her PCP or endocrinology for treatment of this abnormal level  Patient verbalized understanding  Repeat labs ordered

## 2019-12-03 ENCOUNTER — TELEPHONE (OUTPATIENT)
Dept: BARIATRICS | Facility: CLINIC | Age: 44
End: 2019-12-03

## 2019-12-03 NOTE — TELEPHONE ENCOUNTER
I called patient to find out if she is changing her insurance next year she stated no she has CBC I explained her plan change as of 1/1/20 6 months non surgical then 3 hour eval and then 6 months surgical  She was not happy she said she is calling them she had already been approved but could not schedule due to her TSH 8 1 and had to wait to see a PCP to get below 5 I told her to mention that to her insurance see if they will allow her surgery when TSH is under control due to being approved  Will hold account open until I hear back from her

## 2019-12-16 ENCOUNTER — TELEPHONE (OUTPATIENT)
Dept: BARIATRICS | Facility: CLINIC | Age: 44
End: 2019-12-16

## 2019-12-16 NOTE — TELEPHONE ENCOUNTER
Patient called she contacted [facility] to see if she would be grandfathered in since she was approved  She was told no the new policy takes effect 5/9/15 patient still wanted to proceed  She is scheduled with Dr Rell Forman for 1/6 MWM should then be given 3 hour eval to start 6 month wt checks on surgical side  The only thing qualifying her is her BMI is 50 2 if she goes below within the year program she no longer qualifies and she is aware of this

## 2020-01-23 ENCOUNTER — OFFICE VISIT (OUTPATIENT)
Dept: FAMILY MEDICINE CLINIC | Facility: CLINIC | Age: 45
End: 2020-01-23
Payer: COMMERCIAL

## 2020-01-23 VITALS
RESPIRATION RATE: 18 BRPM | BODY MASS INDEX: 50.26 KG/M2 | SYSTOLIC BLOOD PRESSURE: 116 MMHG | TEMPERATURE: 98.4 F | HEART RATE: 72 BPM | WEIGHT: 256 LBS | DIASTOLIC BLOOD PRESSURE: 80 MMHG | HEIGHT: 60 IN

## 2020-01-23 DIAGNOSIS — D72.829 LEUKOCYTOSIS, UNSPECIFIED TYPE: ICD-10-CM

## 2020-01-23 DIAGNOSIS — E03.9 ACQUIRED HYPOTHYROIDISM: Primary | ICD-10-CM

## 2020-01-23 DIAGNOSIS — E66.01 MORBID (SEVERE) OBESITY DUE TO EXCESS CALORIES (HCC): ICD-10-CM

## 2020-01-23 PROCEDURE — 1036F TOBACCO NON-USER: CPT | Performed by: NURSE PRACTITIONER

## 2020-01-23 PROCEDURE — 99214 OFFICE O/P EST MOD 30 MIN: CPT | Performed by: NURSE PRACTITIONER

## 2020-01-23 PROCEDURE — 3008F BODY MASS INDEX DOCD: CPT | Performed by: NURSE PRACTITIONER

## 2020-01-23 RX ORDER — LEVOTHYROXINE SODIUM 0.03 MG/1
25 TABLET ORAL
Qty: 30 TABLET | Refills: 1 | Status: SHIPPED | OUTPATIENT
Start: 2020-01-23 | End: 2020-02-20

## 2020-01-23 NOTE — ASSESSMENT & PLAN NOTE
Plan to check thyroid US to rule out nodule  Antibody thyroid check with repeat tsh  rule out hashimoto's thyroiditis in setting of psoriasis which is autoimmune mediated  I will start patient on levothyroxine 25mcg daily

## 2020-01-23 NOTE — PROGRESS NOTES
Assessment and Plan:    Problem List Items Addressed This Visit        Endocrine    Acquired hypothyroidism - Primary     Plan to check thyroid US to rule out nodule  Antibody thyroid check with repeat tsh  rule out hashimoto's thyroiditis in setting of psoriasis which is autoimmune mediated  I will start patient on levothyroxine 25mcg daily  Relevant Medications    levothyroxine 25 mcg tablet    Other Relevant Orders    US thyroid    Thyroid Antibodies Panel       Other    Morbid (severe) obesity due to excess calories ([facility] [address] )    Leukocytosis     Patient last cbc with bariatrics still showed a trend up in wbc and platelets  Although not significantly high would like patient to still consult hematology at this time  Relevant Orders    CBC and differential    Ambulatory referral to Hematology / Oncology                 Diagnoses and all orders for this visit:    Acquired hypothyroidism  -     US thyroid; Future  -     Thyroid Antibodies Panel; Future  -     levothyroxine 25 mcg tablet; Take 1 tablet (25 mcg total) by mouth daily in the early morning    Leukocytosis, unspecified type  -     CBC and differential; Future  -     Ambulatory referral to Hematology / Oncology; Future    Morbid (severe) obesity due to excess calories (HCC)              Subjective:      Patient ID: Quita Saravia is a 40 y o  female  CC:    Chief Complaint   Patient presents with    Follow-up     Patient present today for her routine follow up on her thryoid  HPI:    Patient is here to review her blood work that was completed by bariatrics  She has abnormal TSH was not on medication before  Patient bariatric program was put on hold by her insurance due to this and she may not qualify in the next year unless she gains more weight  Patient states for now she would like to pursue non surgical options and adjust her thyroid in hopes that she can los the weight         The following portions of the patient's history were reviewed and updated as appropriate: allergies, current medications, past family history, past medical history, past social history, past surgical history and problem list       Review of Systems   Constitutional: Positive for fatigue  Negative for unexpected weight change (unable to lose weight )  HENT: Positive for hearing loss  Eyes: Negative for visual disturbance  Respiratory: Negative  Cardiovascular: Negative  Negative for palpitations  Gastrointestinal: Negative for constipation  Skin:        Dry skin; psoriasis    Neurological: Negative for dizziness and headaches  Memory difficulties          Data to review:       Objective:    Vitals:    01/23/20 0857   BP: 116/80   BP Location: Left arm   Patient Position: Sitting   Cuff Size: Large   Pulse: 72   Resp: 18   Temp: 98 4 °F (36 9 °C)   TempSrc: Temporal   Weight: 116 kg (256 lb)   Height: 5' (1 524 m)        Physical Exam   Constitutional: She is oriented to person, place, and time  She appears well-developed and well-nourished  HENT:   Head: Normocephalic and atraumatic  Eyes: Pupils are equal, round, and reactive to light  Neck: No JVD present  No thyromegaly present  Cardiovascular: Normal rate, regular rhythm and normal heart sounds  Pulmonary/Chest: Effort normal and breath sounds normal    Neurological: She is alert and oriented to person, place, and time  Psychiatric: She has a normal mood and affect  Thought content normal    Nursing note and vitals reviewed  BMI Counseling: Body mass index is 50 kg/m²  The BMI is above normal  Nutrition recommendations include decreasing portion sizes, moderation in carbohydrate intake, reducing intake of saturated and trans fat and reducing intake of cholesterol  Exercise recommendations include moderate physical activity 150 minutes/week and strength training exercises

## 2020-01-23 NOTE — ASSESSMENT & PLAN NOTE
Patient was with bariatric this has been put on hold due to abnormal thyroid  She would like to pursue non surgical plan and will diet and exercise once her thyroid is under better control

## 2020-01-23 NOTE — ASSESSMENT & PLAN NOTE
Patient last cbc with bariatrics still showed a trend up in wbc and platelets  Although not significantly high would like patient to still consult hematology at this time

## 2020-01-23 NOTE — PATIENT INSTRUCTIONS
Core Strengthening Exercises   WHAT YOU NEED TO KNOW:   What do I need to know about core strengthening exercises? Core strengthening exercises help heal and strengthen muscles of your hips, back, and abdomen to prevent reinjury  They are beginning exercises to help support your spine  Ask your healthcare provider if you need to see a physical therapist for more advanced exercises  · Do the exercises on a mat or firm surface  (not on a bed) to support your spine and avoid low back pain  · Do the exercises in the same order every time  to train your muscles to work together  Your healthcare provider will show you how to perform these exercises  Do them every day, or as directed by your healthcare provider  · Move slowly and smoothly  Avoid fast or jerky motions  · Stop if you feel pain  It is normal to feel some discomfort at first  Regular exercise will help decrease your discomfort over time  How do I perform core strengthening exercises safely? Hold each exercise for 5 seconds  When you can do the exercise without pain for 5 seconds, increase your hold to 10 to 15 seconds  When you can do the exercise without pain for 10 to 15 seconds, add the next exercise  Increase the time you hold each exercise, or repeat the exercises as directed  As you do each exercise, breathe normally  Do not hold your breath  · Abdominal bracing:  Lie on your back with your knees bent and feet flat on the floor  Place your arms in a relaxed position beside your body  Pull your belly button in toward your spine  Do not flatten or arch your back  Tighten the abdominal muscles below your belly button  Hold for 5 seconds  Begin all of your exercises with abdominal bracing  You can also practice abdominal bracing throughout the day while you are sitting or standing  · Bridging:  Lie on your back with your knees bent and feet flat on the floor  Rest your arms at your side   Tighten your buttocks, and then lift your hips 1 inch off the floor  Hold for 5 seconds  When you can do this exercise without pain for 10 seconds, increase the distance you lift your hips  A good goal is to be able to lift your hips so that your shoulders, hips, and knees are in a straight line  · Curl up:  Lie on your back with your knees bent and feet flat on the floor  Place your hands, palms down, underneath the curve in your lower back  Next, with your elbows on the floor, lift your shoulders and chest 2 to 3 inches  Keep your head in line with your shoulders  Hold this position for 5 seconds  When you can do this exercise without pain for 10 to 15 seconds, you may add a rotation  While your shoulders and chest are lifted off the ground, turn slightly to the left and hold  Repeat on the other side  · Dead bug:  Lie on your back with your knees bent and feet flat on the floor  Place your arms in a relaxed position beside your body  Begin with abdominal bracing  Next, raise one leg, keeping your knee bent  Hold for 5 seconds  Repeat with the other leg  When you can do this exercise without pain for 10 to 15 seconds, you may raise one straight leg and hold  Repeat with the other leg  · Quadruped:  Place your hands and knees on the floor  Keep your wrists directly below your shoulders and your knees directly below your hips  Pull your belly button in toward your spine  Do not flatten or arch your back  Tighten your abdominal muscles below your belly button  Hold for 5 seconds  When you can do this exercise without pain for 10 to 15 seconds, you may extend one arm and hold  Repeat on the other side  · Side Bridge:      ¨ Standing side bridge:  Stand next to a wall and extend one arm toward the wall  Place your palm flat on the wall with your fingers pointing upward  Begin with abdominal bracing  Next, without moving your feet, slowly bend your arm to 90 degrees  Hold for 5 seconds  Repeat on the other side   When you can do this exercise without pain for 10 to 15 seconds, you may do the bent leg side bridge on the floor  ¨ Bent leg side bridge:  Lie on one side with your legs, hips, and shoulders in a straight line  Prop yourself up onto your forearm so your elbow is directly below your shoulder  Bend your knees back to 90 degrees  Begin with abdominal bracing  Next, lift your hips and balance yourself on your forearm and knees  Hold for 5 seconds  Repeat on the other side  When you can do this exercise without pain for 10 to 15 seconds, you may do the straight leg side bridge on the floor  ¨ Straight leg side bridge:  Lie on one side with your legs, hips, and shoulders in a straight line  Prop yourself up onto your forearm so your elbow is directly below your shoulder  Begin with abdominal bracing  Lift your hips off the floor and balance yourself on your forearm and the outside of your flexed foot  Do not let your ankle bend sideways  Hold for 5 seconds  Repeat on the other side  When you can do this exercise without pain for 10 to 15 seconds, ask your healthcare provider for more advanced exercises  When should I contact my healthcare provider? · Your pain becomes worse  · You have new pain  · You have questions or concerns about your condition, care, or exercise program   CARE AGREEMENT:   You have the right to help plan your care  Learn about your health condition and how it may be treated  Discuss treatment options with your caregivers to decide what care you want to receive  You always have the right to refuse treatment  The above information is an  only  It is not intended as medical advice for individual conditions or treatments  Talk to your doctor, nurse or pharmacist before following any medical regimen to see if it is safe and effective for you    © 2016 [address] is for End User's use only and may not be sold, redistributed or otherwise used for commercial purposes  All illustrations and images included in CareNotes® are the copyrighted property of [employer]  or [employer]  Low Fat Diet   AMBULATORY CARE:   A low-fat diet  is an eating plan that is low in total fat, unhealthy fat, and cholesterol  You may need to follow a low-fat diet if you have trouble digesting or absorbing fat  You may also need to follow this diet if you have high cholesterol  You can also lower your cholesterol by increasing the amount of fiber in your diet  Soluble fiber is a type of fiber that helps to decrease cholesterol levels  Different types of fat in food:   · Limit unhealthy fats  A diet that is high in cholesterol, saturated fat, and trans fat may cause unhealthy cholesterol levels  Unhealthy cholesterol levels increase your risk of heart disease  ¨ Cholesterol:  Limit intake of cholesterol to less than 200 mg per day  Cholesterol is found in meat, eggs, and dairy  ¨ Saturated fat:  Limit saturated fat to less than 7% of your total daily calories  Ask your dietitian how many calories you need each day  Saturated fat is found in butter, cheese, ice cream, whole milk, and palm oil  Saturated fat is also found in meat, such as beef, pork, chicken skin, and processed meats  Processed meats include sausage, hot dogs, and bologna  ¨ Trans fat:  Avoid trans fat as much as possible  Trans fat is used in fried and baked foods  Foods that say trans fat free on the label may still have up to 0 5 grams of trans fat per serving  · Include healthy fats  Replace foods that are high in saturated and trans fat with foods high in healthy fats  This may help to decrease high cholesterol levels  ¨ Monounsaturated fats: These are found in avocados, nuts, and vegetable oils, such as olive, canola, and sunflower oil  ¨ Polyunsaturated fats: These can be found in vegetable oils, such as soybean or corn oil   Omega-3 fats can help to decrease the risk of heart disease  Omega-3 fats are found in fish, such as salmon, herring, trout, and tuna  Omega-3 fats can also be found in plant foods, such as walnuts, flaxseed, soybeans, and canola oil    Foods to limit or avoid:   · Grains:      ¨ Snacks that are made with partially hydrogenated oils, such as chips, regular crackers, and butter-flavored popcorn    ¨ High-fat baked goods, such as biscuits, croissants, doughnuts, pies, cookies, and pastries    · Dairy:      ¨ Whole milk, 2% milk, and yogurt and ice cream made with whole milk    ¨ Half and half creamer, heavy cream, and whipping cream    ¨ Cheese, cream cheese, and sour cream    · Meats and proteins:      ¨ High-fat cuts of meat (T-bone steak, regular hamburger, and ribs)    ¨ Fried meat, poultry (turkey and chicken), and fish    ¨ Poultry (chicken and turkey) with skin    ¨ Cold cuts (salami or bologna), hot dogs, martines, and sausage    ¨ Whole eggs and egg yolks    · Vegetables and fruits with added fat:      ¨ Fried vegetables or vegetables in butter or high-fat sauces, such as cream or cheese sauces    ¨ Fried fruit or fruit served with butter or cream    · Fats:      ¨ Butter, stick margarine, and shortening    ¨ Coconut, palm oil, and palm kernel oil  Foods to include:   · Grains:      ¨ Whole-grain breads, cereals, pasta, and brown rice    ¨ Low-fat crackers and pretzels    · Vegetables and fruits:      ¨ Fresh, frozen, or canned vegetables (no salt or low-sodium)    ¨ Fresh, frozen, dried, or canned fruit (canned in light syrup or fruit juice)    ¨ Avocado    · Low-fat dairy products:      ¨ Nonfat (skim) or 1% milk    ¨ Nonfat or low-fat cheese, yogurt, and cottage cheese    · Meats and proteins:      ¨ Chicken or turkey with no skin    ¨ Baked or broiled fish    ¨ Lean beef and pork (loin, round, extra lean hamburger)    ¨ Beans and peas, unsalted nuts, soy products    ¨ Egg whites and substitutes    ¨ Seeds and nuts    · Fats:      ¨ Unsaturated oil, such as canola, olive, peanut, soybean, or sunflower oil    ¨ Soft or liquid margarine and vegetable oil spread    ¨ Low-fat salad dressing  Other ways to decrease fat:   · Read food labels before you buy foods  Choose foods that have less than 30% of calories from fat  Choose low-fat or fat-free dairy products  Remember that fat free does not mean calorie free  These foods still contain calories, and too many calories can lead to weight gain  · Trim fat from meat and avoid fried food  Trim all visible fat from meat before you cook it  Remove the skin from poultry  Do not rogers meat, fish, or poultry  Bake, roast, boil, or broil these foods instead  Avoid fried foods  Eat a baked potato instead of Western [place] fries  Steam vegetables instead of sautéing them in butter  · Add less fat to foods  Use imitation martines bits on salads and baked potatoes instead of regular martines bits  Use fat-free or low-fat salad dressings instead of regular dressings  Use low-fat or nonfat butter-flavored topping instead of regular butter or margarine on popcorn and other foods  Ways to decrease fat in recipes:  Replace high-fat ingredients with low-fat or nonfat ones  This may cause baked goods to be drier than usual  You may need to use nonfat cooking spray on pans to prevent food from sticking  You also may need to change the amount of other ingredients, such as water, in the recipe  Try the following:  · Use low-fat or light margarine instead of regular margarine or shortening  · Use lean ground turkey breast or chicken, or lean ground beef (less than 5% fat) instead of hamburger  · Add 1 teaspoon of canola oil to 8 ounces of skim milk instead of using cream or half and half  · Use grated zucchini, carrots, or apples in breads instead of coconut  · Use blenderized, low-fat cottage cheese, plain tofu, or low-fat ricotta cheese instead of cream cheese       · Use 1 egg white and 1 teaspoon of canola oil, or use ¼ cup (2 ounces) of fat-free egg substitute instead of a whole egg  · Replace half of the oil that is called for in a recipe with applesauce when you bake  Use 3 tablespoons of cocoa powder and 1 tablespoon of canola oil instead of a square of baking chocolate  How to increase fiber:  Eat enough high-fiber foods to get 20 to 30 grams of fiber every day  Slowly increase your fiber intake to avoid stomach cramps, gas, and other problems  · Eat 3 ounces of whole-grain foods each day  An ounce is about 1 slice of bread  Eat whole-grain breads, such as whole-wheat bread  Whole wheat, whole-wheat flour, or other whole grains should be listed as the first ingredient on the food label  Replace white flour with whole-grain flour or use half of each in recipes  Whole-grain flour is heavier than white flour, so you may have to add more yeast or baking powder  · Eat a high-fiber cereal for breakfast   Oatmeal is a good source of soluble fiber  Look for cereals that have bran or fiber in the name  Choose whole-grain products, such as brown rice, barley, and whole-wheat pasta  · Eat more beans, peas, and lentils  For example, add beans to soups or salads  Eat at least 5 cups of fruits and vegetables each day  Eat fruits and vegetables with the peel because the peel is high in fiber  © 2017 [address] Information is for End User's use only and may not be sold, redistributed or otherwise used for commercial purposes  All illustrations and images included in CareNotes® are the copyrighted property of [employer]  or [employer]ard Cyr  The above information is an  only  It is not intended as medical advice for individual conditions or treatments  Talk to your doctor, nurse or pharmacist before following any medical regimen to see if it is safe and effective for you    Basic Carbohydrate Counting   AMBULATORY CARE:   Carbohydrate counting  is a way to plan your meals by counting the amount of carbohydrate in foods  Carbohydrates are the sugars, starches, and fiber found in fruit, grains, vegetables, and milk products  Carbohydrates increase your blood sugar levels  Carbohydrate counting can help you eat the right amount of carbohydrate to keep your blood sugar levels under control  What you need to know about planning meals using carbohydrate counting:  · A dietitian or healthcare provider will help you develop a healthy meal plan that works best for you  You will be taught how much carbohydrate to eat or drink for each meal and snack  Your meal plan will be based on your age, weight, usual food intake, and physical activity level  If you have diabetes, it will also include your blood sugar levels and diabetes medicine  Once you know how much carbohydrate you should eat, you can decide what type of food you want to eat  · You will need to know what foods contain carbohydrate and how much they contain  Keep track of the amount of carbohydrate in meals and snacks in order to follow your meal plan  Do not avoid carbohydrates or skip meals  Your blood sugar may fall too low if you do not eat enough carbohydrate or you skip meals  Foods that contain carbohydrate:   · Breads:  Each serving of food listed below contains about 15 g of carbohydrate   ¨ 1 slice of bread (1 ounce) or 1 flour or corn tortilla (6 inch)    ¨ ½ of a hamburger bun or ¼ of a large bagel (about 1 ounce)    ¨ 1 pancake (about 4 inches across and ¼ inch thick)    · Cereals and grains:  Serving sizes of ready-to-eat cereals vary  Look at the serving size and the total carbohydrate amount listed on the food label  Each serving of food listed below contains about 15 g of carbohydrate   ¨ ¾ cup of dry, unsweetened, ready-to-eat cereal or ¼ cup of low-fat granola     ¨ ½ cup of oatmeal or other cooked cereal     ¨ ?  cup of cooked rice or pasta    · Starchy vegetables and beans:  Each serving of food listed below contains about 15 g of carbohydrate   ¨ ½ cup of corn, green peas, sweet potatoes, or mashed potatoes    ¨ ¼ of a large baked potato    ¨ ½ cup of beans, lentils, and peas (garbanzo, gutiérrez, kidney, white, split, black-eyed)    · Crackers and snacks:  Each serving of food listed below contains about 15 g of carbohydrate   ¨ 3 isiah cracker squares or 8 animal crackers     ¨ 6 saltine-type crackers    ¨ 3 cups of popcorn or ¾ ounce of pretzels, potato chips, or tortilla chips    · Fruit:  Each serving of food listed below contains about 15 g of carbohydrate   ¨ 1 small (4 ounce) piece of fresh fruit or ¾ to 1 cup of fresh fruit    ¨ ½ cup of canned or frozen fruit, packed in natural juice    ¨ ½ cup (4 ounces) of unsweetened fruit juice    ¨ 2 tablespoons of dried fruit    · Desserts or sugary foods:  Each serving of food listed below contains about 15 g of carbohydrate   ¨ 2-inch square unfrosted cake or brownie     ¨ 2 small cookies    ¨ ½ cup of ice cream, frozen yogurt, or nondairy frozen yogurt    ¨ ¼ cup of sherbet or sorbet    ¨ 1 tablespoon of regular syrup, jam, or jelly    ¨ 2 tablespoons of light syrup    · Milk and yogurt:  Foods from the milk group contain about 12 g of carbohydrate per serving  ¨ 1 cup of fat-free or low-fat milk    ¨ 1 cup of soy milk    ¨ ? cup of fat-free, yogurt sweetened with artificial sweetener    · Non-starchy vegetables:  Each serving contains about 5 g of carbohydrate   Three servings of non-starch vegetables count as 1 carbohydrate serving  ¨ ½ cup of cooked vegetables or 1 cup of raw vegetables  This includes beets, broccoli, cabbage, cauliflower, cucumber, mushrooms, tomatoes, and zucchini    ¨ ½ cup of vegetable juice  How to use carbohydrate counting to plan meals:   · Count carbohydrate amounts using serving sizes:      ¨ Pasta dinner example: You plan to have pasta, tossed salad, and an 8-ounce glass of milk   Your healthcare provider tells you that you may have 4 carbohydrate servings for dinner  One carbohydrate serving of pasta is ? cup  One cup of pasta will equal 3 carbohydrate servings  An 8-ounce glass of milk will count as 1 carbohydrate serving  These amounts of food would equal 4 carbohydrate servings  One cup of tossed salad does not count toward your carbohydrate servings  · Count carbohydrate amounts using food labels:  Find the total amount of carbohydrate in a packaged food by reading the food label  Food labels tell you the serving size of the food and the total carbohydrate amount in each serving  Find the serving size on the food label and then decide how many servings you will eat  Multiply the number of servings you plan to eat by the carbohydrate amount per serving  ¨ Granola bar snack example: Your meal plan allows you to have 2 carbohydrate servings (30 grams) of carbohydrate for a snack  You plan to eat 1 package of granola bars, which contains 2 bars  According to the food label, the serving size of food in this package is 1 bar  Each serving (1 bar) contains 25 grams of carbohydrate  The total amount of carbohydrate in this package of granola bars would be 50 g  Based on your meal plan, you should eat only 1 bar  Follow up with your healthcare provider as directed:  Write down your questions so you remember to ask them during your visits  © 2017 [address] Information is for End User's use only and may not be sold, redistributed or otherwise used for commercial purposes  All illustrations and images included in CareNotes® are the copyrighted property of [employer]  or Rashard Cyr  The above information is an  only  It is not intended as medical advice for individual conditions or treatments  Talk to your doctor, nurse or pharmacist before following any medical regimen to see if it is safe and effective for you

## 2020-02-20 DIAGNOSIS — E03.9 ACQUIRED HYPOTHYROIDISM: ICD-10-CM

## 2020-02-20 RX ORDER — LEVOTHYROXINE SODIUM 0.03 MG/1
25 TABLET ORAL
Qty: 30 TABLET | Refills: 1 | Status: SHIPPED | OUTPATIENT
Start: 2020-02-20 | End: 2020-02-25 | Stop reason: DRUGHIGH

## 2020-02-21 ENCOUNTER — APPOINTMENT (OUTPATIENT)
Dept: LAB | Facility: CLINIC | Age: 45
End: 2020-02-21
Payer: COMMERCIAL

## 2020-02-21 DIAGNOSIS — R79.89 ABNORMAL TSH: ICD-10-CM

## 2020-02-21 DIAGNOSIS — D72.829 LEUKOCYTOSIS, UNSPECIFIED TYPE: ICD-10-CM

## 2020-02-21 DIAGNOSIS — E03.9 ACQUIRED HYPOTHYROIDISM: ICD-10-CM

## 2020-02-21 LAB
BASOPHILS # BLD AUTO: 0.07 THOUSANDS/ΜL (ref 0–0.1)
BASOPHILS NFR BLD AUTO: 1 % (ref 0–1)
EOSINOPHIL # BLD AUTO: 0.32 THOUSAND/ΜL (ref 0–0.61)
EOSINOPHIL NFR BLD AUTO: 2 % (ref 0–6)
ERYTHROCYTE [DISTWIDTH] IN BLOOD BY AUTOMATED COUNT: 13.4 % (ref 11.6–15.1)
HCT VFR BLD AUTO: 37.9 % (ref 34.8–46.1)
HGB BLD-MCNC: 12 G/DL (ref 11.5–15.4)
IMM GRANULOCYTES # BLD AUTO: 0.06 THOUSAND/UL (ref 0–0.2)
IMM GRANULOCYTES NFR BLD AUTO: 0 % (ref 0–2)
LYMPHOCYTES # BLD AUTO: 4.48 THOUSANDS/ΜL (ref 0.6–4.47)
LYMPHOCYTES NFR BLD AUTO: 32 % (ref 14–44)
MCH RBC QN AUTO: 28.5 PG (ref 26.8–34.3)
MCHC RBC AUTO-ENTMCNC: 31.7 G/DL (ref 31.4–37.4)
MCV RBC AUTO: 90 FL (ref 82–98)
MONOCYTES # BLD AUTO: 0.95 THOUSAND/ΜL (ref 0.17–1.22)
MONOCYTES NFR BLD AUTO: 7 % (ref 4–12)
NEUTROPHILS # BLD AUTO: 8.31 THOUSANDS/ΜL (ref 1.85–7.62)
NEUTS SEG NFR BLD AUTO: 58 % (ref 43–75)
NRBC BLD AUTO-RTO: 0 /100 WBCS
PLATELET # BLD AUTO: 464 THOUSANDS/UL (ref 149–390)
PMV BLD AUTO: 10.3 FL (ref 8.9–12.7)
RBC # BLD AUTO: 4.21 MILLION/UL (ref 3.81–5.12)
T4 FREE SERPL-MCNC: 1.05 NG/DL (ref 0.76–1.46)
TSH SERPL DL<=0.05 MIU/L-ACNC: 4.33 UIU/ML (ref 0.36–3.74)
WBC # BLD AUTO: 14.19 THOUSAND/UL (ref 4.31–10.16)

## 2020-02-21 PROCEDURE — 86376 MICROSOMAL ANTIBODY EACH: CPT

## 2020-02-21 PROCEDURE — 84439 ASSAY OF FREE THYROXINE: CPT

## 2020-02-21 PROCEDURE — 86800 THYROGLOBULIN ANTIBODY: CPT

## 2020-02-21 PROCEDURE — 85025 COMPLETE CBC W/AUTO DIFF WBC: CPT

## 2020-02-21 PROCEDURE — 84443 ASSAY THYROID STIM HORMONE: CPT

## 2020-02-21 PROCEDURE — 36415 COLL VENOUS BLD VENIPUNCTURE: CPT

## 2020-02-22 LAB
THYROGLOB AB SERPL-ACNC: <1 IU/ML (ref 0–0.9)
THYROPEROXIDASE AB SERPL-ACNC: 9 IU/ML (ref 0–34)

## 2020-02-25 DIAGNOSIS — E03.9 ACQUIRED HYPOTHYROIDISM: Primary | ICD-10-CM

## 2020-02-25 RX ORDER — LEVOTHYROXINE SODIUM 0.05 MG/1
50 TABLET ORAL DAILY
Qty: 30 TABLET | Refills: 0 | Status: SHIPPED | OUTPATIENT
Start: 2020-02-25 | End: 2020-03-23

## 2020-03-05 ENCOUNTER — HOSPITAL ENCOUNTER (OUTPATIENT)
Dept: ULTRASOUND IMAGING | Facility: MEDICAL CENTER | Age: 45
Discharge: HOME | End: 2020-03-05
Payer: COMMERCIAL

## 2020-03-05 DIAGNOSIS — E03.9 ACQUIRED HYPOTHYROIDISM: ICD-10-CM

## 2020-03-05 PROCEDURE — 76536 US EXAM OF HEAD AND NECK: CPT

## 2020-03-09 ENCOUNTER — TELEPHONE (OUTPATIENT)
Dept: FAMILY MEDICINE CLINIC | Facility: CLINIC | Age: 45
End: 2020-03-09

## 2020-03-09 NOTE — TELEPHONE ENCOUNTER
Pt called in and stated that she is having dizziness and it all stated when she took the medication for her thyroid  Stated she has dizzines when laying down, sitting, turning head, and any type of movement  Please give pt call back with medical advice  Thank you!

## 2020-03-09 NOTE — TELEPHONE ENCOUNTER
Patient has been on thyroid medication for some time now  dont suspect related   Sound more like vertigo patient can try OTC dramamine

## 2020-03-17 ENCOUNTER — APPOINTMENT (OUTPATIENT)
Dept: LAB | Facility: CLINIC | Age: 45
End: 2020-03-17
Payer: COMMERCIAL

## 2020-03-17 ENCOUNTER — TRANSCRIBE ORDERS (OUTPATIENT)
Dept: LAB | Facility: CLINIC | Age: 45
End: 2020-03-17

## 2020-03-17 DIAGNOSIS — Z79.899 ENCOUNTER FOR LONG-TERM (CURRENT) USE OF OTHER MEDICATIONS: Primary | ICD-10-CM

## 2020-03-17 DIAGNOSIS — Z79.899 ENCOUNTER FOR LONG-TERM (CURRENT) USE OF OTHER MEDICATIONS: ICD-10-CM

## 2020-03-17 LAB
ALBUMIN SERPL BCP-MCNC: 3.2 G/DL (ref 3.5–5)
ALP SERPL-CCNC: 100 U/L (ref 46–116)
ALT SERPL W P-5'-P-CCNC: 15 U/L (ref 12–78)
ANION GAP SERPL CALCULATED.3IONS-SCNC: 6 MMOL/L (ref 4–13)
AST SERPL W P-5'-P-CCNC: 9 U/L (ref 5–45)
BASOPHILS # BLD AUTO: 0.06 THOUSANDS/ΜL (ref 0–0.1)
BASOPHILS NFR BLD AUTO: 0 % (ref 0–1)
BILIRUB SERPL-MCNC: 0.41 MG/DL (ref 0.2–1)
BUN SERPL-MCNC: 12 MG/DL (ref 5–25)
CALCIUM SERPL-MCNC: 8.5 MG/DL (ref 8.3–10.1)
CHLORIDE SERPL-SCNC: 108 MMOL/L (ref 100–108)
CO2 SERPL-SCNC: 27 MMOL/L (ref 21–32)
CREAT SERPL-MCNC: 0.59 MG/DL (ref 0.6–1.3)
EOSINOPHIL # BLD AUTO: 0.28 THOUSAND/ΜL (ref 0–0.61)
EOSINOPHIL NFR BLD AUTO: 2 % (ref 0–6)
ERYTHROCYTE [DISTWIDTH] IN BLOOD BY AUTOMATED COUNT: 13.7 % (ref 11.6–15.1)
GFR SERPL CREATININE-BSD FRML MDRD: 112 ML/MIN/1.73SQ M
GLUCOSE P FAST SERPL-MCNC: 95 MG/DL (ref 65–99)
HBV CORE IGM SER QL: NORMAL
HBV SURFACE AB SER-ACNC: 3.61 MIU/ML
HBV SURFACE AG SER QL: NORMAL
HCT VFR BLD AUTO: 36.7 % (ref 34.8–46.1)
HCV AB SER QL: NORMAL
HGB BLD-MCNC: 11.5 G/DL (ref 11.5–15.4)
IMM GRANULOCYTES # BLD AUTO: 0.05 THOUSAND/UL (ref 0–0.2)
IMM GRANULOCYTES NFR BLD AUTO: 0 % (ref 0–2)
LYMPHOCYTES # BLD AUTO: 4.14 THOUSANDS/ΜL (ref 0.6–4.47)
LYMPHOCYTES NFR BLD AUTO: 28 % (ref 14–44)
MCH RBC QN AUTO: 27.9 PG (ref 26.8–34.3)
MCHC RBC AUTO-ENTMCNC: 31.3 G/DL (ref 31.4–37.4)
MCV RBC AUTO: 89 FL (ref 82–98)
MONOCYTES # BLD AUTO: 1.07 THOUSAND/ΜL (ref 0.17–1.22)
MONOCYTES NFR BLD AUTO: 7 % (ref 4–12)
NEUTROPHILS # BLD AUTO: 9.38 THOUSANDS/ΜL (ref 1.85–7.62)
NEUTS SEG NFR BLD AUTO: 63 % (ref 43–75)
NRBC BLD AUTO-RTO: 0 /100 WBCS
PLATELET # BLD AUTO: 435 THOUSANDS/UL (ref 149–390)
PMV BLD AUTO: 10 FL (ref 8.9–12.7)
POTASSIUM SERPL-SCNC: 3.6 MMOL/L (ref 3.5–5.3)
PROT SERPL-MCNC: 7.5 G/DL (ref 6.4–8.2)
RBC # BLD AUTO: 4.12 MILLION/UL (ref 3.81–5.12)
SODIUM SERPL-SCNC: 141 MMOL/L (ref 136–145)
WBC # BLD AUTO: 14.98 THOUSAND/UL (ref 4.31–10.16)

## 2020-03-17 PROCEDURE — 86480 TB TEST CELL IMMUN MEASURE: CPT

## 2020-03-17 PROCEDURE — 86706 HEP B SURFACE ANTIBODY: CPT

## 2020-03-17 PROCEDURE — 85025 COMPLETE CBC W/AUTO DIFF WBC: CPT

## 2020-03-17 PROCEDURE — 86705 HEP B CORE ANTIBODY IGM: CPT

## 2020-03-17 PROCEDURE — 36415 COLL VENOUS BLD VENIPUNCTURE: CPT

## 2020-03-17 PROCEDURE — 80053 COMPREHEN METABOLIC PANEL: CPT

## 2020-03-17 PROCEDURE — 86803 HEPATITIS C AB TEST: CPT

## 2020-03-17 PROCEDURE — 87340 HEPATITIS B SURFACE AG IA: CPT

## 2020-03-19 LAB
GAMMA INTERFERON BACKGROUND BLD IA-ACNC: 0.03 IU/ML
M TB IFN-G BLD-IMP: NEGATIVE
M TB IFN-G CD4+ BCKGRND COR BLD-ACNC: 0 IU/ML
M TB IFN-G CD4+ BCKGRND COR BLD-ACNC: 0 IU/ML
MITOGEN IGNF BCKGRD COR BLD-ACNC: >10 IU/ML

## 2020-03-23 DIAGNOSIS — E03.9 ACQUIRED HYPOTHYROIDISM: ICD-10-CM

## 2020-03-23 RX ORDER — LEVOTHYROXINE SODIUM 0.05 MG/1
TABLET ORAL
Qty: 30 TABLET | Refills: 0 | Status: SHIPPED | OUTPATIENT
Start: 2020-03-23 | End: 2020-04-20

## 2020-03-27 ENCOUNTER — TELEPHONE (OUTPATIENT)
Dept: ADMINISTRATIVE | Facility: OTHER | Age: 45
End: 2020-03-27

## 2020-03-27 NOTE — TELEPHONE ENCOUNTER
Upon review of the In Basket request we were able to locate, review, and update the patient chart as requested for Pap Smear (HPV) aka Cervical Cancer Screening  Any additional questions or concerns should be emailed to the Practice Liaisons via [email or web address] email, please do not reply via In Basket      Thank you  Armaan Tovar

## 2020-03-27 NOTE — TELEPHONE ENCOUNTER
----- Message from [address] [place] [address] sent at 3/26/2020 10:51 PM EDT -----  Patient PAP 3/5/20 care everywhere

## 2020-04-18 DIAGNOSIS — E03.9 ACQUIRED HYPOTHYROIDISM: ICD-10-CM

## 2020-04-20 RX ORDER — LEVOTHYROXINE SODIUM 0.05 MG/1
TABLET ORAL
Qty: 30 TABLET | Refills: 1 | Status: SHIPPED | OUTPATIENT
Start: 2020-04-20 | End: 2020-05-22 | Stop reason: DRUGHIGH

## 2020-05-15 ENCOUNTER — TELEMEDICINE (OUTPATIENT)
Dept: BARIATRICS | Facility: CLINIC | Age: 45
End: 2020-05-15
Payer: COMMERCIAL

## 2020-05-15 VITALS — WEIGHT: 254 LBS | HEIGHT: 60 IN | BODY MASS INDEX: 49.87 KG/M2

## 2020-05-15 DIAGNOSIS — E78.2 MIXED HYPERLIPIDEMIA: ICD-10-CM

## 2020-05-15 DIAGNOSIS — E03.9 ACQUIRED HYPOTHYROIDISM: ICD-10-CM

## 2020-05-15 DIAGNOSIS — L40.9 PSORIASIS: ICD-10-CM

## 2020-05-15 DIAGNOSIS — E66.01 OBESITY, CLASS III, BMI 40-49.9 (MORBID OBESITY) (HCC): Primary | ICD-10-CM

## 2020-05-15 PROCEDURE — 99214 OFFICE O/P EST MOD 30 MIN: CPT | Performed by: PHYSICIAN ASSISTANT

## 2020-05-15 RX ORDER — LEVOTHYROXINE SODIUM 0.05 MG/1
TABLET ORAL
Qty: 30 TABLET | Refills: 1 | OUTPATIENT
Start: 2020-05-15

## 2020-05-21 ENCOUNTER — APPOINTMENT (OUTPATIENT)
Dept: LAB | Facility: HOSPITAL | Age: 45
End: 2020-05-21
Payer: COMMERCIAL

## 2020-05-21 DIAGNOSIS — E03.9 ACQUIRED HYPOTHYROIDISM: ICD-10-CM

## 2020-05-21 LAB
T4 FREE SERPL-MCNC: 1.04 NG/DL (ref 0.76–1.46)
TSH SERPL DL<=0.05 MIU/L-ACNC: 4.98 UIU/ML (ref 0.36–3.74)

## 2020-05-21 PROCEDURE — 84439 ASSAY OF FREE THYROXINE: CPT

## 2020-05-21 PROCEDURE — 84443 ASSAY THYROID STIM HORMONE: CPT

## 2020-05-21 PROCEDURE — 36415 COLL VENOUS BLD VENIPUNCTURE: CPT

## 2020-05-22 DIAGNOSIS — E03.9 ACQUIRED HYPOTHYROIDISM: Primary | ICD-10-CM

## 2020-05-22 RX ORDER — LEVOTHYROXINE SODIUM 0.07 MG/1
75 TABLET ORAL DAILY
Qty: 30 TABLET | Refills: 1 | Status: SHIPPED | OUTPATIENT
Start: 2020-05-22 | End: 2020-08-02

## 2020-06-16 ENCOUNTER — TELEPHONE (OUTPATIENT)
Dept: BARIATRICS | Facility: CLINIC | Age: 45
End: 2020-06-16

## 2020-06-16 ENCOUNTER — TELEMEDICINE (OUTPATIENT)
Dept: BARIATRICS | Facility: CLINIC | Age: 45
End: 2020-06-16
Payer: COMMERCIAL

## 2020-06-16 VITALS — BODY MASS INDEX: 49.87 KG/M2 | WEIGHT: 254 LBS | HEIGHT: 60 IN

## 2020-06-16 DIAGNOSIS — E78.2 MIXED HYPERLIPIDEMIA: ICD-10-CM

## 2020-06-16 DIAGNOSIS — E66.01 MORBID OBESITY (HCC): Primary | ICD-10-CM

## 2020-06-16 DIAGNOSIS — E03.9 ACQUIRED HYPOTHYROIDISM: ICD-10-CM

## 2020-06-16 PROCEDURE — 99213 OFFICE O/P EST LOW 20 MIN: CPT | Performed by: PHYSICIAN ASSISTANT

## 2020-07-16 ENCOUNTER — TELEMEDICINE (OUTPATIENT)
Dept: BARIATRICS | Facility: CLINIC | Age: 45
End: 2020-07-16
Payer: COMMERCIAL

## 2020-07-16 VITALS — WEIGHT: 254 LBS | HEIGHT: 60 IN | BODY MASS INDEX: 49.87 KG/M2

## 2020-07-16 DIAGNOSIS — E03.9 ACQUIRED HYPOTHYROIDISM: ICD-10-CM

## 2020-07-16 DIAGNOSIS — L40.9 PSORIASIS: ICD-10-CM

## 2020-07-16 DIAGNOSIS — E66.01 OBESITY, CLASS III, BMI 40-49.9 (MORBID OBESITY) (HCC): Primary | ICD-10-CM

## 2020-07-16 PROCEDURE — 3008F BODY MASS INDEX DOCD: CPT | Performed by: PHYSICIAN ASSISTANT

## 2020-07-16 PROCEDURE — 1036F TOBACCO NON-USER: CPT | Performed by: PHYSICIAN ASSISTANT

## 2020-07-16 PROCEDURE — 99213 OFFICE O/P EST LOW 20 MIN: CPT | Performed by: PHYSICIAN ASSISTANT

## 2020-07-16 NOTE — PROGRESS NOTES
Virtual Brief Visit    Assessment/Plan:    Problem List Items Addressed This Visit        Endocrine    Acquired hypothyroidism       Musculoskeletal and Integument    Psoriasis      Other Visit Diagnoses     Obesity, Class III, BMI 40-49 9 (morbid obesity) ([facility] [address] )    -  Primary             Obesity, class III  -Patient pursuing conservative program   -Initial weight loss goal of 5-10% weight loss for improved health  -Screening labs; recent repeat TSH wnl   - 8669-4509 calories per day for 1-2 lbs weight loss/week  Patient will try to remain around 1200 calories a day  Add 100-150 calories on exercise days  - check medication coverage with insurance   - has been on phentermine in the past with side effects (anxiety)- avoid going forward  - remains at same weight since last month, attributes slower weight loss progress due to quarantine  - Follow up in approximately 1 month    Hypothyroidism  - on levothyroxine     Psoriasis  - on Otezla  - persistent leukocytosis due to this      Hyperlipidemia  - follow up with PCP   - should improve with continued weight loss     Reason for visit is 3/6 wt check  Chief Complaint   Patient presents with    Weight Check    Virtual Brief Visit        Encounter provider Alexus Galo PA-C    Provider located at [address] [place] [phone]    Recent Visits  No visits were found meeting these conditions  Showing recent visits within past 7 days and meeting all other requirements     Today's Visits  Date Type Provider Dept   07/16/20 Telemedicine Carmel Guzman PA-C [facility]   Showing today's visits and meeting all other requirements     Future Appointments  No visits were found meeting these conditions  Showing future appointments within next 150 days and meeting all other requirements        After connecting through telephone, the patient was identified by name and date of birth   Jori Spence was informed that this is a telemedicine visit and that the visit is being conducted through telephone  My office door was closed  No one else was in the room  She acknowledged consent and understanding of privacy and security of the platform  The patient has agreed to participate and understands she can discontinue the visit at any time  Patient is aware this is a billable service  Subjective    Sandra Mcclendon is a 39 y o  female presenting for 3/6 weight check  Current weight: 254 lbs    Patient is pursuing conservative program  She remains at same weight since last visit  Is exercising by taking frequent walks   She is not food logging    Past Medical History:   Diagnosis Date    Anxiety     Asthma     Disease of thyroid gland     hypo    Morbid obesity ([address] )     Psoriasis     Skin disorder        Past Surgical History:   Procedure Laterality Date     SECTION   &     2    KNEE SURGERY      [place] EGD TRANSORAL BIOPSY SINGLE/MULTIPLE N/A 2018    Procedure: ESOPHAGOGASTRODUODENOSCOPY (EGD) with bx;  Surgeon: Daniel Gomes MD;  Location: [facility]; Service: Bariatrics       Current Outpatient Medications   Medication Sig Dispense Refill    clobetasol (TEMOVATE) 0 05 % cream APPLY TOPICALLY 2 (TWO) TIMES A DAY 60 g 3    levothyroxine 75 mcg tablet Take 1 tablet (75 mcg total) by mouth daily 30 tablet 1    Secukinumab (COSENTYX SC) Inject under the skin       No current facility-administered medications for this visit  No Known Allergies    Review of Systems   Constitutional: Negative  HENT: Negative  Respiratory: Negative  Cardiovascular: Negative  Gastrointestinal: Negative  Musculoskeletal: Positive for arthralgias  Skin: Negative          Vitals:    20 0838   Weight: 115 kg (254 lb)   Height: 5' (1 524 m)         I spent 10 minutes directly with the patient during this visit    [facility] acknowledges that she has consented to an online visit or consultation  She understands that the online visit is based solely on information provided by her, and that, in the absence of a face-to-face physical evaluation by the physician, the diagnosis she receives is both limited and provisional in terms of accuracy and completeness  This is not intended to replace a full medical face-to-face evaluation by the physician  Liz Irizarry understands and accepts these terms

## 2020-07-31 DIAGNOSIS — E03.9 ACQUIRED HYPOTHYROIDISM: ICD-10-CM

## 2020-08-02 RX ORDER — LEVOTHYROXINE SODIUM 0.07 MG/1
TABLET ORAL
Qty: 30 TABLET | Refills: 1 | Status: SHIPPED | OUTPATIENT
Start: 2020-08-02 | End: 2020-09-02

## 2020-08-17 ENCOUNTER — TELEPHONE (OUTPATIENT)
Dept: BARIATRICS | Facility: CLINIC | Age: 45
End: 2020-08-17

## 2020-08-17 ENCOUNTER — TELEMEDICINE (OUTPATIENT)
Dept: BARIATRICS | Facility: CLINIC | Age: 45
End: 2020-08-17
Payer: COMMERCIAL

## 2020-08-17 VITALS — BODY MASS INDEX: 48.82 KG/M2 | WEIGHT: 250 LBS

## 2020-08-17 DIAGNOSIS — E66.01 OBESITY, CLASS III, BMI 40-49.9 (MORBID OBESITY) (HCC): Primary | ICD-10-CM

## 2020-08-17 DIAGNOSIS — E78.2 MIXED HYPERLIPIDEMIA: ICD-10-CM

## 2020-08-17 DIAGNOSIS — L40.9 PSORIASIS: ICD-10-CM

## 2020-08-17 DIAGNOSIS — E03.9 ACQUIRED HYPOTHYROIDISM: ICD-10-CM

## 2020-08-17 PROCEDURE — 99213 OFFICE O/P EST LOW 20 MIN: CPT | Performed by: PHYSICIAN ASSISTANT

## 2020-08-17 PROCEDURE — 1036F TOBACCO NON-USER: CPT | Performed by: PHYSICIAN ASSISTANT

## 2020-08-17 NOTE — PATIENT INSTRUCTIONS
· Continued/Maintain healthy weight loss with good nutrition intakes  · Adequate hydration with at least 64oz  fluid intake  · Follow diet as discussed  · Exercise as tolerated

## 2020-08-17 NOTE — PROGRESS NOTES
Virtual Brief Visit    Assessment/Plan:    Problem List Items Addressed This Visit        Endocrine    Acquired hypothyroidism       Musculoskeletal and Integument    Psoriasis       Other    Mixed hyperlipidemia      Other Visit Diagnoses     Obesity, Class III, BMI 40-49 9 (morbid obesity) ([facility] [address] )    -  Primary            Obesity, class III  -Patient pursuing conservative program   -Initial weight loss goal of 5-10% weight loss for improved health  -Screening labs; pending repeat tsh per pcp  - continue with 1200 calories a day  Add 100-150 calories on exercise days  - check medication coverage with insurance  Patient not really interested in medication at this time as surgery remains her ultimate goal  - has been on phentermine in the past with side effects (anxiety)- avoid going forward  - down 4 lbs since last visit based on current scale (took weight at work)  - Follow up in approximately 1 month     Hypothyroidism  - on levothyroxine  - pending repeat tsh per pcp      Psoriasis  - on Otezla  - persistent leukocytosis due to this      Hyperlipidemia  - follow up with PCP   - should improve with continued weight loss     FOLLOW UP IN 1 MONTH   TO CALL     Reason for visit is 4/6 [facility] weight check  Chief Complaint   Patient presents with    Weight Check    Virtual Brief Visit        Encounter provider Keely Elaine PA-C    Provider located at [address] [place] [phone]    Recent Visits  No visits were found meeting these conditions  Showing recent visits within past 7 days and meeting all other requirements     Today's Visits  Date Type Provider Dept   08/17/20 Telephone Ruben Seth RN [facility]   08/17/20 Telemedicine Carmel Sarmiento PA-C [facility]   Showing today's visits and meeting all other requirements     Future Appointments  No visits were found meeting these conditions     Showing future appointments within next 150 days and meeting all other requirements        After connecting through telephone, the patient was identified by name and date of birth  Rajan May was informed that this is a telemedicine visit and that the visit is being conducted through telephone  My office door was closed  No one else was in the room  She acknowledged consent and understanding of privacy and security of the platform  The patient has agreed to participate and understands she can discontinue the visit at any time  Patient is aware this is a billable service  Subjective    Rajan May is a 39 y o  female here for 4/6 [facility] weight check  Current weight: 250 lbs     Patient is pursuing conservative program      She is not food logging   Eating around 1200 calories per day   Exercises by walking 2-3 times a week   Hydration wise doing very well   Surgery still remains final goal     Meal diary  B: protein shake with frozen fruit; spinach and almond milk  S: cashew (can substitute with other high protein snack like cheese as cashew can contain high calorie content)  L: salad with protein   D: varies but always a protein with a lot of veggies   S: sometimes eats ice cream     Past Medical History:   Diagnosis Date    Anxiety     Asthma     Disease of thyroid gland     hypo    Morbid obesity ([address] )     Psoriasis     Skin disorder        Past Surgical History:   Procedure Laterality Date     SECTION   &     2    KNEE SURGERY      [place] EGD TRANSORAL BIOPSY SINGLE/MULTIPLE N/A 2018    Procedure: ESOPHAGOGASTRODUODENOSCOPY (EGD) with bx;  Surgeon: Olivia Barth MD;  Location: [facility];   Service: Bariatrics       Current Outpatient Medications   Medication Sig Dispense Refill    clobetasol (TEMOVATE) 0 05 % cream APPLY TOPICALLY 2 (TWO) TIMES A DAY 60 g 3    levothyroxine 75 mcg tablet TAKE 1 TABLET BY MOUTH EVERY DAY 30 tablet 1    Secukinumab (COSENTYX SC) Inject under the skin       No current facility-administered medications for this visit  No Known Allergies    Review of Systems   Constitutional: Negative  HENT: Negative  Respiratory: Negative  Cardiovascular: Negative  Gastrointestinal: Negative  Musculoskeletal: Positive for arthralgias  Skin: Negative  Neurological: Positive for dizziness (mild; pcp is aware and following; pending repeat tsh)  Negative for weakness and headaches  Psychiatric/Behavioral: Negative  Vitals:    08/17/20 1020   Weight: 113 kg (250 lb)         I spent 15 minutes directly with the patient during this visit    VIRTUAL VISIT DISCLAIMER    Flor Lux acknowledges that she has consented to an online visit or consultation  She understands that the online visit is based solely on information provided by her, and that, in the absence of a face-to-face physical evaluation by the physician, the diagnosis she receives is both limited and provisional in terms of accuracy and completeness  This is not intended to replace a full medical face-to-face evaluation by the physician  Flor Lux understands and accepts these terms

## 2020-09-01 ENCOUNTER — OFFICE VISIT (OUTPATIENT)
Dept: FAMILY MEDICINE CLINIC | Facility: CLINIC | Age: 45
End: 2020-09-01
Payer: COMMERCIAL

## 2020-09-01 VITALS
DIASTOLIC BLOOD PRESSURE: 76 MMHG | HEART RATE: 88 BPM | TEMPERATURE: 98 F | WEIGHT: 268.8 LBS | RESPIRATION RATE: 18 BRPM | BODY MASS INDEX: 52.77 KG/M2 | SYSTOLIC BLOOD PRESSURE: 122 MMHG | HEIGHT: 60 IN

## 2020-09-01 DIAGNOSIS — M62.838 NECK MUSCLE SPASM: ICD-10-CM

## 2020-09-01 DIAGNOSIS — H53.9 VISUAL DISTURBANCE: ICD-10-CM

## 2020-09-01 DIAGNOSIS — R42 VERTIGO: Primary | ICD-10-CM

## 2020-09-01 PROCEDURE — 99214 OFFICE O/P EST MOD 30 MIN: CPT | Performed by: NURSE PRACTITIONER

## 2020-09-01 RX ORDER — CYCLOBENZAPRINE HCL 10 MG
10 TABLET ORAL
Qty: 30 TABLET | Refills: 0 | Status: SHIPPED | OUTPATIENT
Start: 2020-09-01 | End: 2020-10-13

## 2020-09-01 NOTE — PROGRESS NOTES
Assessment and Plan:    Problem List Items Addressed This Visit        Musculoskeletal and Integument    Neck muscle spasm     Will given hand out for neck stretches and exercises  Muscle spasm in neck can certainly contribute to ongoing intermittent episodes of vertigo  Relevant Medications    cyclobenzaprine (FLEXERIL) 10 mg tablet       Other    Vertigo - Primary     With change in typical vertigo symptoms  Will send patient to vestibular therapy  Will also order blood work to check thyroid, blood count and electrolytes  MRI of brain ordered given associated with visual changes and headache and previous features from vertigo  Patient tried meclizine/dramamine without relief  Patient advised to not drive until vertigo resolved  Relevant Orders    Ambulatory referral to Physical Therapy    Comprehensive metabolic panel    CBC and differential    MRI brain w wo contrast      Other Visit Diagnoses     Visual disturbance        Relevant Orders    Ambulatory referral to Physical Therapy    Comprehensive metabolic panel    CBC and differential    MRI brain w wo contrast                  Diagnoses and all orders for this visit:    Vertigo  -     Ambulatory referral to Physical Therapy; Future  -     Comprehensive metabolic panel; Future  -     CBC and differential; Future  -     MRI brain w wo contrast; Future    Visual disturbance  -     Ambulatory referral to Physical Therapy; Future  -     Comprehensive metabolic panel; Future  -     CBC and differential; Future  -     MRI brain w wo contrast; Future    Neck muscle spasm  -     cyclobenzaprine (FLEXERIL) 10 mg tablet; Take 1 tablet (10 mg total) by mouth daily at bedtime as needed for muscle spasms              Subjective:      Patient ID: Brianne Reynaga is a 39 y o  female      CC:    Chief Complaint   Patient presents with    Dizziness     it has been on and off, about 2 months and it is not consistant, everything spins so fast and then she gets nausea        HPI:    Patient reports she having dizzy episodes again this time it seems to be worse  she states that the episodes are lasting longer than previous episodes  Patient states yesterday she was at a stop sign and quickly looked to the left and could not drive  She has increased her water intake  She has been taking dramamine on a regular basis and sees no improvement  She has also tried afizan slices but it has not been helping as well      patient reports even with sitting still she can can close her eyes and have an episode  She states her kids have told her at home during attack that her eyes are moving from side to side  She also reports she was unable to say her  name during one episode  Dizziness   This is a recurrent problem  The current episode started more than 1 month ago  The problem occurs intermittently  Associated symptoms include congestion (in the morning ), headaches, nausea, neck pain (radaites into her shoulders ), vertigo and a visual change  Pertinent negatives include no abdominal pain, chest pain, chills, diaphoresis or weakness  The symptoms are aggravated by bending, exertion, standing and twisting  She has tried eating, drinking, position changes and rest for the symptoms  The treatment provided moderate relief  The following portions of the patient's history were reviewed and updated as appropriate: allergies, current medications, past family history, past medical history, past social history, past surgical history and problem list       Review of Systems   Constitutional: Negative for chills and diaphoresis  HENT: Positive for congestion (in the morning )  Negative for ear pain, hearing loss and tinnitus  Eyes: Positive for photophobia (slight light sensitivty when looking up she will get a headache) and visual disturbance  Respiratory: Negative for chest tightness and shortness of breath      Cardiovascular: Negative for chest pain, palpitations and leg swelling  Gastrointestinal: Positive for nausea  Negative for abdominal pain  Musculoskeletal: Positive for neck pain (radaites into her shoulders )  Neurological: Positive for dizziness, vertigo and headaches  Negative for tremors, weakness and light-headedness  Psychiatric/Behavioral: Negative for dysphoric mood  The patient is not nervous/anxious  Data to review:       Objective:    Vitals:    09/01/20 1400   BP: 122/76   BP Location: Left arm   Patient Position: Sitting   Cuff Size: Adult   Pulse: 88   Resp: 18   Temp: 98 °F (36 7 °C)   TempSrc: Tympanic   Weight: 122 kg (268 lb 12 8 oz)   Height: 5' (1 524 m)        Physical Exam  Vitals signs and nursing note reviewed  Constitutional:       General: She is not in acute distress  Appearance: Normal appearance  She is obese  She is not ill-appearing, toxic-appearing or diaphoretic  HENT:      Head: Normocephalic and atraumatic  Right Ear: Tympanic membrane normal       Left Ear: Tympanic membrane normal    Eyes:      General: No scleral icterus  Extraocular Movements: Extraocular movements intact  Conjunctiva/sclera: Conjunctivae normal       Pupils: Pupils are equal, round, and reactive to light  Funduscopic exam:     Right eye: No papilledema  Red reflex present  Left eye: No papilledema  Red reflex present  Neck:      Musculoskeletal: Decreased range of motion (left lateral roation )  No muscular tenderness  Thyroid: No thyromegaly  Vascular: No carotid bruit  Cardiovascular:      Rate and Rhythm: Normal rate and regular rhythm  Pulses: Normal pulses  Heart sounds: Normal heart sounds, S1 normal and S2 normal  No murmur  Pulmonary:      Effort: Pulmonary effort is normal       Breath sounds: Normal breath sounds  Musculoskeletal:      Right lower leg: No edema  Left lower leg: No edema        Comments: Motor function equal bilaterally    Lymphadenopathy: Cervical: No cervical adenopathy  Neurological:      Mental Status: She is alert and oriented to person, place, and time  Cranial Nerves: Cranial nerves are intact  Coordination: Romberg sign negative  Coordination normal  Heel to Shin Test normal       Gait: Gait is intact

## 2020-09-01 NOTE — ASSESSMENT & PLAN NOTE
With change in typical vertigo symptoms  Will send patient to vestibular therapy  Will also order blood work to check thyroid, blood count and electrolytes  MRI of brain ordered given associated with visual changes and headache and previous features from vertigo  Patient tried meclizine/dramamine without relief  Patient advised to not drive until vertigo resolved

## 2020-09-01 NOTE — ASSESSMENT & PLAN NOTE
Will given hand out for neck stretches and exercises  Muscle spasm in neck can certainly contribute to ongoing intermittent episodes of vertigo

## 2020-09-02 DIAGNOSIS — E03.9 ACQUIRED HYPOTHYROIDISM: ICD-10-CM

## 2020-09-02 RX ORDER — LEVOTHYROXINE SODIUM 0.07 MG/1
TABLET ORAL
Qty: 30 TABLET | Refills: 1 | Status: SHIPPED | OUTPATIENT
Start: 2020-09-02 | End: 2020-09-23 | Stop reason: DRUGHIGH

## 2020-09-08 ENCOUNTER — TELEPHONE (OUTPATIENT)
Dept: FAMILY MEDICINE CLINIC | Facility: CLINIC | Age: 45
End: 2020-09-08

## 2020-09-08 NOTE — TELEPHONE ENCOUNTER
HN:  MRI BRAIN HAS BEEN DENIED BY INSUR, NO DOCUMENTATION OF HEAD INJURY, NO HEADACHES, NO SUSPECTED TUMOR OR STROKE  PLEASE CONTACT PT WITH FURTHER INSTRUCTIONS @ [phone]  SHE HAS AN APPT SCHEDULED AT [facility] FOR 9-12, THAT SHOULD BE CANCELLED BECAUSE HER CASE HAS BEEN DENIED

## 2020-09-16 ENCOUNTER — APPOINTMENT (OUTPATIENT)
Dept: LAB | Facility: CLINIC | Age: 45
End: 2020-09-16
Payer: COMMERCIAL

## 2020-09-16 DIAGNOSIS — H53.9 VISUAL DISTURBANCE: ICD-10-CM

## 2020-09-16 DIAGNOSIS — R42 VERTIGO: ICD-10-CM

## 2020-09-16 DIAGNOSIS — E03.9 ACQUIRED HYPOTHYROIDISM: ICD-10-CM

## 2020-09-16 LAB
ALBUMIN SERPL BCP-MCNC: 3.4 G/DL (ref 3.5–5)
ALP SERPL-CCNC: 104 U/L (ref 46–116)
ALT SERPL W P-5'-P-CCNC: 16 U/L (ref 12–78)
ANION GAP SERPL CALCULATED.3IONS-SCNC: 10 MMOL/L (ref 4–13)
AST SERPL W P-5'-P-CCNC: 11 U/L (ref 5–45)
BASOPHILS # BLD AUTO: 0.07 THOUSANDS/ΜL (ref 0–0.1)
BASOPHILS NFR BLD AUTO: 0 % (ref 0–1)
BILIRUB SERPL-MCNC: 0.39 MG/DL (ref 0.2–1)
BUN SERPL-MCNC: 11 MG/DL (ref 5–25)
CALCIUM ALBUM COR SERPL-MCNC: 9.9 MG/DL (ref 8.3–10.1)
CALCIUM SERPL-MCNC: 9.4 MG/DL (ref 8.3–10.1)
CHLORIDE SERPL-SCNC: 104 MMOL/L (ref 100–108)
CO2 SERPL-SCNC: 24 MMOL/L (ref 21–32)
CREAT SERPL-MCNC: 0.7 MG/DL (ref 0.6–1.3)
EOSINOPHIL # BLD AUTO: 0.5 THOUSAND/ΜL (ref 0–0.61)
EOSINOPHIL NFR BLD AUTO: 3 % (ref 0–6)
ERYTHROCYTE [DISTWIDTH] IN BLOOD BY AUTOMATED COUNT: 13.5 % (ref 11.6–15.1)
GFR SERPL CREATININE-BSD FRML MDRD: 105 ML/MIN/1.73SQ M
GLUCOSE P FAST SERPL-MCNC: 119 MG/DL (ref 65–99)
HCT VFR BLD AUTO: 39.5 % (ref 34.8–46.1)
HGB BLD-MCNC: 12.5 G/DL (ref 11.5–15.4)
IMM GRANULOCYTES # BLD AUTO: 0.1 THOUSAND/UL (ref 0–0.2)
IMM GRANULOCYTES NFR BLD AUTO: 1 % (ref 0–2)
LYMPHOCYTES # BLD AUTO: 5.36 THOUSANDS/ΜL (ref 0.6–4.47)
LYMPHOCYTES NFR BLD AUTO: 28 % (ref 14–44)
MCH RBC QN AUTO: 28.7 PG (ref 26.8–34.3)
MCHC RBC AUTO-ENTMCNC: 31.6 G/DL (ref 31.4–37.4)
MCV RBC AUTO: 91 FL (ref 82–98)
MONOCYTES # BLD AUTO: 1.28 THOUSAND/ΜL (ref 0.17–1.22)
MONOCYTES NFR BLD AUTO: 7 % (ref 4–12)
NEUTROPHILS # BLD AUTO: 12.17 THOUSANDS/ΜL (ref 1.85–7.62)
NEUTS SEG NFR BLD AUTO: 61 % (ref 43–75)
NRBC BLD AUTO-RTO: 0 /100 WBCS
PLATELET # BLD AUTO: 516 THOUSANDS/UL (ref 149–390)
PMV BLD AUTO: 10.3 FL (ref 8.9–12.7)
POTASSIUM SERPL-SCNC: 3.8 MMOL/L (ref 3.5–5.3)
PROT SERPL-MCNC: 8 G/DL (ref 6.4–8.2)
RBC # BLD AUTO: 4.35 MILLION/UL (ref 3.81–5.12)
SODIUM SERPL-SCNC: 138 MMOL/L (ref 136–145)
TSH SERPL DL<=0.05 MIU/L-ACNC: 7.34 UIU/ML (ref 0.36–3.74)
WBC # BLD AUTO: 19.48 THOUSAND/UL (ref 4.31–10.16)

## 2020-09-16 PROCEDURE — 84443 ASSAY THYROID STIM HORMONE: CPT | Performed by: NURSE PRACTITIONER

## 2020-09-16 PROCEDURE — 80053 COMPREHEN METABOLIC PANEL: CPT

## 2020-09-16 PROCEDURE — 85025 COMPLETE CBC W/AUTO DIFF WBC: CPT

## 2020-09-16 PROCEDURE — 36415 COLL VENOUS BLD VENIPUNCTURE: CPT

## 2020-09-16 RX ORDER — LEVOTHYROXINE SODIUM 0.07 MG/1
TABLET ORAL
Qty: 90 TABLET | Refills: 1 | OUTPATIENT
Start: 2020-09-16

## 2020-09-22 ENCOUNTER — TELEPHONE (OUTPATIENT)
Dept: FAMILY MEDICINE CLINIC | Facility: CLINIC | Age: 45
End: 2020-09-22

## 2020-09-23 DIAGNOSIS — E03.9 ACQUIRED HYPOTHYROIDISM: Primary | ICD-10-CM

## 2020-09-23 RX ORDER — LEVOTHYROXINE SODIUM 0.1 MG/1
100 TABLET ORAL
Qty: 90 TABLET | Refills: 0 | Status: SHIPPED | OUTPATIENT
Start: 2020-09-23 | End: 2020-12-23

## 2020-09-29 ENCOUNTER — TELEMEDICINE (OUTPATIENT)
Dept: BARIATRICS | Facility: CLINIC | Age: 45
End: 2020-09-29
Payer: COMMERCIAL

## 2020-09-29 VITALS — BODY MASS INDEX: 50.6 KG/M2 | WEIGHT: 268 LBS | HEIGHT: 61 IN

## 2020-09-29 DIAGNOSIS — E66.01 MORBID (SEVERE) OBESITY DUE TO EXCESS CALORIES (HCC): ICD-10-CM

## 2020-09-29 DIAGNOSIS — E03.9 ACQUIRED HYPOTHYROIDISM: ICD-10-CM

## 2020-09-29 DIAGNOSIS — E66.01 MORBID (SEVERE) OBESITY DUE TO EXCESS CALORIES (HCC): Primary | ICD-10-CM

## 2020-09-29 DIAGNOSIS — L40.9 PSORIASIS: ICD-10-CM

## 2020-09-29 PROCEDURE — 99214 OFFICE O/P EST MOD 30 MIN: CPT | Performed by: PHYSICIAN ASSISTANT

## 2020-09-29 RX ORDER — TOPIRAMATE 25 MG/1
25 TABLET ORAL DAILY
Qty: 30 TABLET | Refills: 1 | Status: SHIPPED | OUTPATIENT
Start: 2020-09-29 | End: 2020-09-29 | Stop reason: SDUPTHER

## 2020-09-29 RX ORDER — TOPIRAMATE 25 MG/1
25 TABLET ORAL
Qty: 30 TABLET | Refills: 1 | Status: SHIPPED | OUTPATIENT
Start: 2020-09-29 | End: 2021-08-30 | Stop reason: ALTCHOICE

## 2020-09-29 RX ORDER — TOPIRAMATE 25 MG/1
25 TABLET ORAL DAILY
Qty: 30 TABLET | Refills: 1 | Status: SHIPPED | OUTPATIENT
Start: 2020-09-29 | End: 2020-09-29 | Stop reason: CLARIF

## 2020-09-29 RX ORDER — DIMENHYDRINATE 50 MG/1
50 TABLET ORAL 2 TIMES DAILY PRN
COMMUNITY

## 2020-09-29 NOTE — PATIENT INSTRUCTIONS
The potential side effects of topiramate may include numbness or tingling, fatigue, upper respiratory infection symptoms, depression/anxiety, changes in taste, confusion, abdominal upset/heartburn, and trouble sleeping  Notify the provider with any changes in mood  ER with thoughts of harming yourself/others  Notify the provider with any changes in vision  Goals:  Food log (ie ) www myfitnesspal com,sparkpeople  com,loseit com,calorieking  com,etc  baritastic  No sugary beverages  At least 64oz of water daily  Increase physical activity by 10 minutes daily  Gradually increase physical activity to a goal of 5 days per week for 30 minutes of MODERATE intensity PLUS 2 days per week of FULL BODY resistance training  5-10 servings of fruits and vegetables per day and 25-35 grams of dietary fiber per day, gradually increasing  1200 calorie per day goal     Topamax has been sent to your pharmacy    Please schedule 1 month follow up

## 2020-09-29 NOTE — PROGRESS NOTES
Virtual Brief Visit    Assessment/Plan:    Problem List Items Addressed This Visit        Endocrine    Acquired hypothyroidism       Musculoskeletal and Integument    Psoriasis       Other    Morbid (severe) obesity due to excess calories ([address] ) - Primary         -Patient pursuing conservative program   -Initial weight loss goal of 5-10% weight loss for improved health  -Screening labs; pending repeat tsh per pcp  - continue with 1200 calories a day  Add 100-150 calories on exercise days  - check medication coverage with insurance  Patient not really interested in medication at this time as surgery remains her ultimate goal  - has been on phentermine in the past with side effects (anxiety)- avoid going forward  - up in weight due to decreased physical activity and late night snacking  We discussed adding medication if patient is interested  Would consider topamax as she denies hx kidney stones or glaucoma and can take nightly to help curb cravings  Also offered dietician follow up to discuss useful dietary changes and healthy snacking options  Patient states she would be interested in trying Topamax at this time    - Follow up in approximately 1 month    Discussed The potential side effects of topiramate may include numbness or tingling, fatigue, upper respiratory infection symptoms, depression/anxiety, changes in taste, confusion, abdominal upset/heartburn, and trouble sleeping  Notify the provider with any changes in mood  ER with thoughts of harming yourself/others  Notify the provider with any changes in vision  Follow up in approximately 1 month  Patient states she will call this afternoon to make appointment         Reason for visit is 5/6 [facility] weight check  Chief Complaint   Patient presents with    Virtual Brief Visit       Encounter provider Keely Elaine PA-C    Provider located at [address] [place] [address]  [phone]    Recent Visits  Date Type Provider Dept   09/22/20 Telephone [facility]   Showing recent visits within past 7 days and meeting all other requirements     Today's Visits  Date Type Provider Dept   09/29/20 Telemedicine Carmel Subramanian PA-C [facility]   Showing today's visits and meeting all other requirements     Future Appointments  No visits were found meeting these conditions  Showing future appointments within next 150 days and meeting all other requirements        After connecting through telephone, the patient was identified by name and date of birth  Berenice Zacarias was informed that this is a telemedicine visit and that the visit is being conducted through telephone  My office door was closed  No one else was in the room  She acknowledged consent and understanding of privacy and security of the platform  The patient has agreed to participate and understands she can discontinue the visit at any time  Patient is aware this is a billable service  Patient ID: Berenice Zacarias  is a 39 y o  female with excess weight/obesity here to pursue weight management  Patient is pursuing Conservative Program      The patient presents for MWM follow up  Current weight: 268 lbs; patient states her last recorded weight of 250 lbs was incorrect because it was taken on a scale at school  Her digital scale at home today read 268 lbs  She states she has stopped exercising due to knee pain  She states her knee pain is related to her psoriatic arthritis   Currently on cosentyx injection     Food logging: yes  Increased appetite/cravings: yes; cravings sweets usually after 3 pm or when stressed   Fruit/Vegetable servings: 1-2 daily   Exercise:no  Hydration: 64 oz daily of water     Daily food recall  B: protein shake  L: usually leftovers from night before   S: fruit or jello or yogurt   D: lean protein + salad occasionally + veggies   S: more snacking due to covid and kids being home; chips or cookies     She denies kidney stones or glaucoma history  Denies pancreatitis or personal/family hx of thyroid cancer but is on levothyroxine     The following portions of the patient's history were reviewed and updated as appropriate: allergies, current medications, past family history, past medical history, past social history, past surgical history and problem list       Objective:    Ht 5' 0 7" (1 542 m)   Wt 122 kg (268 lb) Comment: Patient reported due to teams visit due to COVID 19  BMI 51 14 kg/m²      Physical Exam: N/A      Vitals:    09/29/20 1136   Weight: 122 kg (268 lb)   Height: 5' 0 7" (1 542 m)         I spent 25 minutes directly with the patient during this visit    VIRTUAL VISIT DISCLAIMER    Laurel Clfiton acknowledges that she has consented to an online visit or consultation  She understands that the online visit is based solely on information provided by her, and that, in the absence of a face-to-face physical evaluation by the physician, the diagnosis she receives is both limited and provisional in terms of accuracy and completeness  This is not intended to replace a full medical face-to-face evaluation by the physician  Laurel Clifton understands and accepts these terms

## 2020-09-30 ENCOUNTER — TELEPHONE (OUTPATIENT)
Dept: BARIATRICS | Facility: CLINIC | Age: 45
End: 2020-09-30

## 2020-09-30 NOTE — TELEPHONE ENCOUNTER
Left message for patient to return our call back and schedule next weight check IN OFFICE patient must come in for the last weight check   Mailed AVS home from virtual visit on 09/29/2020

## 2020-10-12 DIAGNOSIS — M62.838 NECK MUSCLE SPASM: ICD-10-CM

## 2020-10-13 RX ORDER — CYCLOBENZAPRINE HCL 10 MG
10 TABLET ORAL
Qty: 30 TABLET | Refills: 3 | Status: SHIPPED | OUTPATIENT
Start: 2020-10-13

## 2020-10-21 ENCOUNTER — OFFICE VISIT (OUTPATIENT)
Dept: BARIATRICS | Facility: CLINIC | Age: 45
End: 2020-10-21
Payer: COMMERCIAL

## 2020-10-21 VITALS
BODY MASS INDEX: 50.31 KG/M2 | HEIGHT: 61 IN | DIASTOLIC BLOOD PRESSURE: 84 MMHG | RESPIRATION RATE: 18 BRPM | HEART RATE: 100 BPM | TEMPERATURE: 97.4 F | SYSTOLIC BLOOD PRESSURE: 138 MMHG | WEIGHT: 266.5 LBS

## 2020-10-21 DIAGNOSIS — E03.9 ACQUIRED HYPOTHYROIDISM: ICD-10-CM

## 2020-10-21 DIAGNOSIS — L40.9 PSORIASIS: ICD-10-CM

## 2020-10-21 DIAGNOSIS — E78.2 MIXED HYPERLIPIDEMIA: ICD-10-CM

## 2020-10-21 DIAGNOSIS — E66.01 MORBID (SEVERE) OBESITY DUE TO EXCESS CALORIES (HCC): Primary | ICD-10-CM

## 2020-10-21 PROCEDURE — 99214 OFFICE O/P EST MOD 30 MIN: CPT | Performed by: PHYSICIAN ASSISTANT

## 2020-10-21 PROCEDURE — 1036F TOBACCO NON-USER: CPT | Performed by: PHYSICIAN ASSISTANT

## 2020-11-02 ENCOUNTER — CLINICAL SUPPORT (OUTPATIENT)
Dept: BARIATRICS | Facility: CLINIC | Age: 45
End: 2020-11-02

## 2020-11-02 VITALS
SYSTOLIC BLOOD PRESSURE: 128 MMHG | DIASTOLIC BLOOD PRESSURE: 80 MMHG | HEART RATE: 87 BPM | TEMPERATURE: 96.7 F | BODY MASS INDEX: 53.01 KG/M2 | WEIGHT: 270 LBS | HEIGHT: 60 IN

## 2020-11-02 DIAGNOSIS — E66.01 MORBID OBESITY (HCC): Primary | ICD-10-CM

## 2020-11-02 PROCEDURE — RECHECK

## 2020-11-02 PROCEDURE — 3008F BODY MASS INDEX DOCD: CPT | Performed by: PHYSICIAN ASSISTANT

## 2020-12-02 ENCOUNTER — OFFICE VISIT (OUTPATIENT)
Dept: BARIATRICS | Facility: CLINIC | Age: 45
End: 2020-12-02
Payer: COMMERCIAL

## 2020-12-02 VITALS
SYSTOLIC BLOOD PRESSURE: 124 MMHG | WEIGHT: 270.5 LBS | HEART RATE: 104 BPM | DIASTOLIC BLOOD PRESSURE: 76 MMHG | BODY MASS INDEX: 49.78 KG/M2 | HEIGHT: 62 IN | TEMPERATURE: 98.1 F

## 2020-12-02 DIAGNOSIS — E03.9 ACQUIRED HYPOTHYROIDISM: ICD-10-CM

## 2020-12-02 DIAGNOSIS — E66.01 MORBID (SEVERE) OBESITY DUE TO EXCESS CALORIES (HCC): Primary | ICD-10-CM

## 2020-12-02 DIAGNOSIS — Z01.818 PREOPERATIVE TESTING: ICD-10-CM

## 2020-12-02 PROCEDURE — 3008F BODY MASS INDEX DOCD: CPT | Performed by: PHYSICIAN ASSISTANT

## 2020-12-02 PROCEDURE — 1036F TOBACCO NON-USER: CPT | Performed by: PHYSICIAN ASSISTANT

## 2020-12-02 PROCEDURE — 99214 OFFICE O/P EST MOD 30 MIN: CPT | Performed by: PHYSICIAN ASSISTANT

## 2020-12-23 DIAGNOSIS — E03.9 ACQUIRED HYPOTHYROIDISM: ICD-10-CM

## 2020-12-23 RX ORDER — LEVOTHYROXINE SODIUM 0.1 MG/1
100 TABLET ORAL
Qty: 90 TABLET | Refills: 0 | Status: SHIPPED | OUTPATIENT
Start: 2020-12-23 | End: 2021-02-18 | Stop reason: SDUPTHER

## 2021-01-06 ENCOUNTER — OFFICE VISIT (OUTPATIENT)
Dept: BARIATRICS | Facility: CLINIC | Age: 46
End: 2021-01-06
Payer: COMMERCIAL

## 2021-01-06 VITALS
WEIGHT: 272 LBS | SYSTOLIC BLOOD PRESSURE: 144 MMHG | DIASTOLIC BLOOD PRESSURE: 78 MMHG | HEART RATE: 98 BPM | TEMPERATURE: 97.9 F | HEIGHT: 62 IN | BODY MASS INDEX: 50.05 KG/M2 | RESPIRATION RATE: 21 BRPM

## 2021-01-06 DIAGNOSIS — E66.01 MORBID (SEVERE) OBESITY DUE TO EXCESS CALORIES (HCC): Primary | ICD-10-CM

## 2021-01-06 DIAGNOSIS — E78.2 MIXED HYPERLIPIDEMIA: ICD-10-CM

## 2021-01-06 PROCEDURE — 99213 OFFICE O/P EST LOW 20 MIN: CPT | Performed by: SURGERY

## 2021-01-06 NOTE — PROGRESS NOTES
FOLLOW UP VISIT - BARIATRIC SURGERY  Talon Briceño 39 y o  female MRN: [id number]  Unit/Bed#:  Encounter: [id number]      HPI:  Talon Briceño is a 39 y o  female who presents with longstanding history morbid obesity and inability to sustain any meaningful weight loss on her own  She is interested in bariatric operation  Review of Systems   All other systems reviewed and are negative  Historical Information   Past Medical History:   Diagnosis Date    Anxiety     Asthma     Disease of thyroid gland     hypo    Morbid obesity ([address] )     Psoriasis     Skin disorder     Vertigo      Past Surgical History:   Procedure Laterality Date     SECTION   &     2    KNEE SURGERY      [place] EGD TRANSORAL BIOPSY SINGLE/MULTIPLE N/A 2018    Procedure: ESOPHAGOGASTRODUODENOSCOPY (EGD) with bx;  Surgeon: Debbie Vann MD;  Location: [facility]; Service: Bariatrics     Social History   Social History     Substance and Sexual Activity   Alcohol Use Yes    Frequency: Monthly or less    Comment: occasional     Social History     Substance and Sexual Activity   Drug Use No     Social History     Tobacco Use   Smoking Status Never Smoker   Smokeless Tobacco Never Used     Family History: non-contributory    Meds/Allergies   all medications and allergies reviewed  No Known Allergies    Objective       Current Vitals:   Blood Pressure: 144/78 (21 1350)  Pulse: 98 (21 1350)  Temperature: 97 9 °F (36 6 °C) (21 1350)  Respirations: 21 (21 1350)  Height: 5' 1 5" (156 2 cm) (21 1350)  Weight - Scale: 123 kg (272 lb) (21 1350)        Invasive Devices     None                 Physical Exam  Vitals signs reviewed  Constitutional:       General: She is not in acute distress  Appearance: She is well-developed  She is not diaphoretic  HENT:      Head: Normocephalic and atraumatic        Right Ear: External ear normal       Left Ear: External ear normal  Nose: Nose normal    Eyes:      General: No scleral icterus  Right eye: No discharge  Left eye: No discharge  Conjunctiva/sclera: Conjunctivae normal    Neurological:      Mental Status: She is alert and oriented to person, place, and time  Psychiatric:         Behavior: Behavior normal          Thought Content: Thought content normal          Judgment: Judgment normal          Lab Results: I have personally reviewed pertinent lab results  Imaging: I have personally reviewed pertinent reports  EKG, Pathology, and Other Studies: I have personally reviewed pertinent reports  Assessment/PLAN:    39 y o  female with a longstanding history morbid obesity  She is interested in undergoing a laparoscopic Vadim-en-Y gastric bypass possible sleeve gastrectomy  She started the process over a year ago but everything came to a halt after the COVID-19 and because she has some issues with her thyroid  As a part of her pre op evaluation, she will be referred to a cardiologist and for a sleep evaluation and consult  She needs an EGD to evaluate the anatomy of her GI tract prior to the operation  I have spent over 45 minutes with her face to face in the office today discussing her options and details of the surgery  We have seen an animation of the surgery on the computer that illustrates how the operation is done and how the anatomy will be altered with the procedure  Over 50% of this was coordinating care  She was given the opportunity to ask questions and I have answered all of them  I have discussed and educated the patient with regards to the components of our multidisciplinary program and the importance of compliance and follow up in the post operative period  The patient was also instructed with regards to the importance of behavior modification, nutritional counseling, support meeting attendance and lifestyle changes that are important to ensure success       Although there is a great statistical chance of improvement or even resolution of most of her associated comorbidities, the results vary from patient to patient and they largely depend on her commitment and compliance  She needs to lose 12-15  lbs prior to the operation      Ariana Solo MD  1/6/2021  2:09 PM

## 2021-01-11 ENCOUNTER — TELEMEDICINE (OUTPATIENT)
Dept: BARIATRICS | Facility: CLINIC | Age: 46
End: 2021-01-11
Payer: COMMERCIAL

## 2021-01-11 VITALS — HEIGHT: 62 IN | WEIGHT: 272 LBS | BODY MASS INDEX: 50.05 KG/M2

## 2021-01-11 DIAGNOSIS — E03.9 ACQUIRED HYPOTHYROIDISM: ICD-10-CM

## 2021-01-11 DIAGNOSIS — D72.829 LEUKOCYTOSIS, UNSPECIFIED TYPE: ICD-10-CM

## 2021-01-11 DIAGNOSIS — E66.01 MORBID (SEVERE) OBESITY DUE TO EXCESS CALORIES (HCC): Primary | ICD-10-CM

## 2021-01-11 PROCEDURE — 1036F TOBACCO NON-USER: CPT | Performed by: PHYSICIAN ASSISTANT

## 2021-01-11 PROCEDURE — 3008F BODY MASS INDEX DOCD: CPT | Performed by: PHYSICIAN ASSISTANT

## 2021-01-11 PROCEDURE — 99214 OFFICE O/P EST MOD 30 MIN: CPT | Performed by: PHYSICIAN ASSISTANT

## 2021-01-11 NOTE — PROGRESS NOTES
Virtual Brief Visit    Assessment/Plan:    Problem List Items Addressed This Visit        Endocrine    Acquired hypothyroidism       Other    Morbid (severe) obesity due to excess calories ([address] ) - Primary    Leukocytosis        Interested in Vadim-En-Y Gastric Bypass with Dr Concetta Pepper  10% Weight loss-Results pending ; needs to lose 12-15 lbs prior to operation  Encouraged consistent food logging  Screening labs-Completed CBC, CMP And TSH 9/2020  Pending repeat TSH and lipid panel  Leukocytosis per patient is due to psoriasis  Has appt with her PCP this week for repeat labs  Support Group-Not Completed  Cardiac Risk Assessment-Not Completed will schedule soon   Upper Endoscopy-Not Completed; H  Pylori biopsy-Not completed, H pylori stool antigen test ordered-  no  Sleep Medicine Assessment-Not applicable  Alcohol and nicotine use is not recommended following bariatric surgery  NSAIDs should be avoided following bariatric surgery  Advised that pregnancy should be avoided following bariatric surgery for 12-18 months and should not solely rely on OCP and consider additional/alternative sources for contraception due to potential absorption issues-Completed        Reason for visit is   Chief Complaint   Patient presents with    Virtual Brief Visit        Encounter provider Tara Castellanos PA-C    Provider located at [address] [place] [phone]    Recent Visits  Date Type Provider Dept   01/06/21 Office Visit Debbie Vann MD [facility]   Showing recent visits within past 7 days and meeting all other requirements     Today's Visits  Date Type Provider Dept   01/11/21 Telemedicine Carmel Brandon PA-C [facility]   Showing today's visits and meeting all other requirements     Future Appointments  No visits were found meeting these conditions     Showing future appointments within next 150 days and meeting all other requirements After connecting through telephone, the patient was identified by name and date of birth  Bartolo Hayden was informed that this is a telemedicine visit and that the visit is being conducted through telephone  My office door was closed  No one else was in the room  She acknowledged consent and understanding of privacy and security of the platform  The patient has agreed to participate and understands she can discontinue the visit at any time  Patient is aware this is a billable service  Subjective    Bartolo Hayden is a 39 y o  female presenting for 2/4 wt checks  Current weight: 272 lbs        Following manual - yes   Food logging- no  Practicing  rule- yes  Exercise- walking  Alcohol - social drinking   Tobacco- no    Past Medical History:   Diagnosis Date    Anxiety     Asthma     Disease of thyroid gland     hypo    Morbid obesity ([address] )     Psoriasis     Skin disorder     Vertigo        Past Surgical History:   Procedure Laterality Date     SECTION   &     2    KNEE SURGERY      [place] E[place] TRANSORAL BIOPSY SINGLE/MULTIPLE N/A 2018    Procedure: ESOPHAGOGASTRODUODENOSCOPY (EGD) with bx;  Surgeon: Ariana Solo MD;  Location: [facility];   Service: Bariatrics       Current Outpatient Medications   Medication Sig Dispense Refill    clobetasol (TEMOVATE) 0 05 % cream APPLY TOPICALLY 2 (TWO) TIMES A DAY 60 g 3    cyclobenzaprine (FLEXERIL) 10 mg tablet TAKE 1 TABLET (10 MG TOTAL) BY MOUTH DAILY AT BEDTIME AS NEEDED FOR MUSCLE SPASMS 30 tablet 3    dimenhyDRINATE (DRAMAMINE) 50 mg tablet Take 50 mg by mouth 2 (two) times a day as needed for movement disorders      levothyroxine 100 mcg tablet TAKE 1 TABLET (100 MCG TOTAL) BY MOUTH DAILY IN THE EARLY MORNING 90 tablet 0    Secukinumab (COSENTYX SC) Inject under the skin      topiramate (TOPAMAX) 25 mg tablet Take 1 tablet (25 mg total) by mouth daily at bedtime (Patient not taking: Reported on 10/21/2020) 30 tablet 1     No current facility-administered medications for this visit  No Known Allergies    Review of Systems    Vitals:    01/11/21 1037   Weight: 123 kg (272 lb)   Height: 5' 1 5" (1 562 m)         I spent 30 minutes directly with the patient during this visit    VIRTUAL VISIT DISCLAIMER    Yanely Guzman acknowledges that she has consented to an online visit or consultation  She understands that the online visit is based solely on information provided by her, and that, in the absence of a face-to-face physical evaluation by the physician, the diagnosis she receives is both limited and provisional in terms of accuracy and completeness  This is not intended to replace a full medical face-to-face evaluation by the physician  Yanely Guzman understands and accepts these terms

## 2021-02-17 ENCOUNTER — LAB (OUTPATIENT)
Dept: LAB | Facility: CLINIC | Age: 46
End: 2021-02-17
Payer: COMMERCIAL

## 2021-02-17 DIAGNOSIS — E03.9 ACQUIRED HYPOTHYROIDISM: ICD-10-CM

## 2021-02-17 DIAGNOSIS — Z01.818 PREOPERATIVE TESTING: ICD-10-CM

## 2021-02-17 DIAGNOSIS — E66.01 MORBID (SEVERE) OBESITY DUE TO EXCESS CALORIES (HCC): ICD-10-CM

## 2021-02-17 LAB
CHOLEST SERPL-MCNC: 305 MG/DL (ref 50–200)
HDLC SERPL-MCNC: 56 MG/DL
LDLC SERPL CALC-MCNC: 188 MG/DL (ref 0–100)
NONHDLC SERPL-MCNC: 249 MG/DL
T4 FREE SERPL-MCNC: 0.98 NG/DL (ref 0.76–1.46)
TRIGL SERPL-MCNC: 304 MG/DL
TSH SERPL DL<=0.05 MIU/L-ACNC: 3.82 UIU/ML (ref 0.36–3.74)

## 2021-02-17 PROCEDURE — 84443 ASSAY THYROID STIM HORMONE: CPT

## 2021-02-17 PROCEDURE — 80061 LIPID PANEL: CPT

## 2021-02-17 PROCEDURE — 84439 ASSAY OF FREE THYROXINE: CPT

## 2021-02-17 PROCEDURE — 36415 COLL VENOUS BLD VENIPUNCTURE: CPT

## 2021-02-18 DIAGNOSIS — E03.9 ACQUIRED HYPOTHYROIDISM: ICD-10-CM

## 2021-02-19 DIAGNOSIS — E03.9 ACQUIRED HYPOTHYROIDISM: ICD-10-CM

## 2021-02-19 DIAGNOSIS — E78.2 MIXED HYPERLIPIDEMIA: Primary | ICD-10-CM

## 2021-02-19 RX ORDER — LEVOTHYROXINE SODIUM 112 UG/1
112 TABLET ORAL
Qty: 90 TABLET | Refills: 0 | Status: SHIPPED | OUTPATIENT
Start: 2021-02-19 | End: 2021-06-30 | Stop reason: DRUGHIGH

## 2021-02-19 RX ORDER — SIMVASTATIN 40 MG
40 TABLET ORAL
Qty: 90 TABLET | Refills: 3 | Status: SHIPPED | OUTPATIENT
Start: 2021-02-19

## 2021-02-23 ENCOUNTER — ANESTHESIA EVENT (OUTPATIENT)
Dept: GASTROENTEROLOGY | Facility: HOSPITAL | Age: 46
End: 2021-02-23

## 2021-02-23 NOTE — PROGRESS NOTES
Bariatric Nutrition Assessment Note  i  Name was verified by patient stating name? yes  ii   verified by patient stating? yes  iii  You verified the patient is alone? yes  iv  I would like to verify that you were offered a live visit but are now consenting to this telephone visit? yes  v  This visit is free yes    Type of surgery    Preop  Surgery Date: TBD- patient completed 6 month medical weight management program  Patient is here today to be evaluation to start the 4 month surgical program     Surgeon: Dr Manuel Gill  39 y o   female     Wt with BMI of 25: 130 7lbs  Pre-Op Excess Wt: 132 3lbs  Ht 5' 0 7" (1 542 m)   Wt 123 kg (272 lb) Comment: self reported  BMI 51 90 kg/m²   Weight stable x 1 month; up 2 lbs from initial visit with RN  5% weight loss GW: 257 to schedule surgery    [facility] Equation:   1791 kcal   Estimated calories for weight loss 5771-3063 kcal ( 1-2# per wk wt loss - sedentary )  Estimated protein needs 60-72 grams (1 0-1 2 gms/kg IBW )   Estimated fluid needs 2080 ml (35 ml/kg IBW )   70 ounces per day     Weight History   Onset of Obesity: Childhood  Family history of obesity: Yes  Wt Loss Attempts: Commercial Programs ([employer], Amparo Parnell, etc )  FAD Diets (Cabbage soup, Grapefruit, Cleanse, etc )  High Protein/Low CHO diets (Atkins, Union, etc )  Meal Replacements ([employer] [place] [employer], etc )  Nutrition Counseling with RD  OTC meds/supplements  Self Created Diets (Portion Control, Healthy Food Choices, etc )  Medical weight loss program   Maximum Wt Lost: 40lbs    Review of History and Medications   Past Medical History:   Diagnosis Date    Anxiety     Asthma     Disease of thyroid gland     hypo    Morbid obesity ([address] )     Psoriasis     Skin disorder     Vertigo      Past Surgical History:   Procedure Laterality Date     SECTION   & 2010    2    KNEE SURGERY      [place] EGD TRANSORAL BIOPSY SINGLE/MULTIPLE N/A 9/12/2018    Procedure: ESOPHAGOGASTRODUODENOSCOPY (EGD) with bx;  Surgeon: Mckayla Quintero MD;  Location: [facility];   Service: Bariatrics     Social History     Socioeconomic History    Marital status: /Civil Union     Spouse name: Not on file    Number of children: 2    Years of education: Not on file    Highest education level: Not on file   Occupational History    Occupation: Employed   Social Needs    Financial resource strain: Not on file    Food insecurity     Worry: Not on file     Inability: Not on file   [employer] needs     Medical: Not on file     Non-medical: Not on file   Tobacco Use    Smoking status: Never Smoker    Smokeless tobacco: Never Used   Substance and Sexual Activity    Alcohol use: Yes     Frequency: Monthly or less     Comment: occasional    Drug use: No    Sexual activity: Yes     Partners: Male     Birth control/protection: None   Lifestyle    Physical activity     Days per week: Not on file     Minutes per session: Not on file    Stress: Not on file   Relationships    Social connections     Talks on phone: Not on file     Gets together: Not on file     Attends Druze service: Not on file     Active member of club or organization: Not on file     Attends meetings of clubs or organizations: Not on file     Relationship status: Not on file    Intimate partner violence     Fear of current or ex partner: Not on file     Emotionally abused: Not on file     Physically abused: Not on file     Forced sexual activity: Not on file   Other Topics Concern    Not on file   Social History Narrative    Always uses seat belt    Digerati ([address])    Lives in private house    Lives with children    Lives with spouse    No advance directives    No caffeine use    No Living will    Supportive and safe               Current Outpatient Medications:     clobetasol (TEMOVATE) 0 05 % cream, APPLY TOPICALLY 2 (TWO) TIMES A DAY, Disp: 60 g, Rfl: 3    cyclobenzaprine (FLEXERIL) 10 mg tablet, TAKE 1 TABLET (10 MG TOTAL) BY MOUTH DAILY AT BEDTIME AS NEEDED FOR MUSCLE SPASMS, Disp: 30 tablet, Rfl: 3    dimenhyDRINATE (DRAMAMINE) 50 mg tablet, Take 50 mg by mouth 2 (two) times a day as needed for movement disorders, Disp: , Rfl:     levothyroxine 112 mcg tablet, Take 1 tablet (112 mcg total) by mouth daily in the early morning, Disp: 90 tablet, Rfl: 0    Secukinumab (COSENTYX SC), Inject under the skin, Disp: , Rfl:     simvastatin (ZOCOR) 40 mg tablet, Take 1 tablet (40 mg total) by mouth daily at bedtime, Disp: 90 tablet, Rfl: 3    topiramate (TOPAMAX) 25 mg tablet, Take 1 tablet (25 mg total) by mouth daily at bedtime (Patient not taking: Reported on 10/21/2020), Disp: 30 tablet, Rfl: 1  No current facility-administered medications for this visit       Facility-Administered Medications Ordered in Other Visits:     sodium chloride 0 9 % infusion, 125 mL/hr, Intravenous, Continuous, Asia Purcell DO, Stopped at 02/24/21 9582  Food Intake and Lifestyle Assessment   Food Intake Assessment completed via usual diet recall  Wake up: 6am  Bed: 10pm    7am Breakfast: meal replacement shake with almond milk and fruit( [employer]it[employer] Advantage ) or eggs ( on weekends)   10:30am Snack: 100 calorie granola bar (chewy) or mixed nuts  1pm Lunch: packed leftovers from night before: baked chicken drumstick with side salad (yesterday quinoa with chicken and avocado and chick peas)  2pm Snack: tea with honey  6pm Dinner: see lunch (does pick while cooking)   Snack: 6oz wine (1x/week) or juice or water  Beverage intake: water and sugar free beverages and one cup coffee (2% milk, 2 tsp sugar) per day  Protein supplement: Protein powder B[employer] Advantage  Estimated protein intake per day: 60-80g  Estimated fluid intake per day: 32-64oz, water, 8oz coffee, 8oz tea, 8oz juice, 6oz wine  Meals eaten away from home: been avoiding due to stomach issues  Typical meal pattern: 3 meals per day and 1-2 snacks per day  Eating Behaviors: Consumption of high calorie/ high fat foods and beverages  Food allergies or intolerances: No Known Allergies  Cultural or Taoist considerations:   Physical Assessment  Physical Activity  Types of exercise: no walking due to the snow- scared to fall  Current physical limitations: knee pain    Psychosocial Assessment   Support systems: spouse and children  Spouse had bariatric surgery   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, 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     Nutrition Diagnosis  Diagnosis: Overweight / Obesity (NC-3 3) and Excessive energy intake (NI-1 5)  Related to: Physical inactivity and Excessive energy intake  As Evidenced by: BMI >25 and Excessive energy intake     Nutrition Prescription: Recommend the following diet  Regular    Interventions and Teaching   Discussed pre-op and post-op nutrition guidelines  Patient educated and handouts provided    Surgical changes to stomach / GI  Capacity of post-surgery stomach  Diet progression  Adequate hydration  Sugar and fat restriction to decrease "dumping syndrome"  Expected weight loss  Exercise  Suggestions for pre-op diet  Nutrition considerations after surgery  Protein supplements  Meal planning and preparation  Appropriate carbohydrate, protein, and fat intake, and food/fluid choices to maximize safe weight loss, nutrient intake, and tolerance   Dietary and lifestyle changes  Possible problems with poor eating habits  Techniques for self monitoring and keeping daily food journal  Potential for food intolerance after surgery, and ways to deal with them including: lactose intolerance, nausea, reflux, vomiting, diarrhea, food intolerance, appetite changes, gas  Vitamin / Mineral supplementation of Multivitamin with minerals and Vitamin D  Patient has had tubal ligation     Education provided to: patient    Barriers to learning: No barriers identified    Readiness to change: preparation    Prior research on procedure: discussed with provider and pre-op class    Comprehension: verbalizes understanding     Expected Compliance: good  Recommendations  Pt is an appropriate candidate for surgery  Yes    Evaluation / Monitoring  Dietitian to Monitor: Eating pattern as discussed Tolerance of nutrition prescription Body weight Lab values Physical activity    Goals  Food journal, Exercise 30 minutes 5 times per week, Complete lession plans 1-6, Eat 3 meals per day and Eliminate mindless snacking   Food Log 7546-1160 calories, 65-75 grams of protein   Continue with vitamin and mineral supplement and 2000 IU of vitamin D3   Eliminate sugar sweetened beverages  Include protein source in snack choices    Workflow:   PCP Letter: complete   Support Group: incomplete   6 Month Pre-Operative Program: today is 3/4   Bloodwork: Blood work reviewed, TSH elevated but trending down and patient is working with her PCP for this  All other labs WNL, CBC and CMP will be > 10month old by end of March 2021 patient aware these may need to be repeated   Cardiac Risk Assessment: incomplete   Sleep Studies: n/a   EGD 2/24/2021   Weight Loss: incomplete   Psych/D+A/Nicotine?      Time Spent:   30 minutes

## 2021-02-24 ENCOUNTER — HOSPITAL ENCOUNTER (OUTPATIENT)
Dept: GASTROENTEROLOGY | Facility: HOSPITAL | Age: 46
Setting detail: OUTPATIENT SURGERY
Discharge: HOME | End: 2021-02-24
Attending: SURGERY | Admitting: SURGERY
Payer: COMMERCIAL

## 2021-02-24 ENCOUNTER — ANESTHESIA (OUTPATIENT)
Dept: GASTROENTEROLOGY | Facility: HOSPITAL | Age: 46
End: 2021-02-24

## 2021-02-24 VITALS
SYSTOLIC BLOOD PRESSURE: 130 MMHG | DIASTOLIC BLOOD PRESSURE: 76 MMHG | OXYGEN SATURATION: 97 % | RESPIRATION RATE: 18 BRPM | HEART RATE: 83 BPM | TEMPERATURE: 98.2 F

## 2021-02-24 VITALS — HEART RATE: 86 BPM

## 2021-02-24 DIAGNOSIS — E66.01 MORBID OBESITY (HCC): ICD-10-CM

## 2021-02-24 LAB
EXT PREGNANCY TEST URINE: NEGATIVE
EXT. CONTROL: NORMAL

## 2021-02-24 PROCEDURE — 88305 TISSUE EXAM BY PATHOLOGIST: CPT | Performed by: PATHOLOGY

## 2021-02-24 PROCEDURE — 81025 URINE PREGNANCY TEST: CPT | Performed by: ANESTHESIOLOGY

## 2021-02-24 PROCEDURE — 43239 EGD BIOPSY SINGLE/MULTIPLE: CPT | Performed by: SURGERY

## 2021-02-24 RX ORDER — SODIUM CHLORIDE 9 MG/ML
125 INJECTION, SOLUTION INTRAVENOUS CONTINUOUS
Status: DISCONTINUED | OUTPATIENT
Start: 2021-02-24 | End: 2021-02-28 | Stop reason: HOSPADM

## 2021-02-24 RX ORDER — PROPOFOL 10 MG/ML
INJECTION, EMULSION INTRAVENOUS AS NEEDED
Status: DISCONTINUED | OUTPATIENT
Start: 2021-02-24 | End: 2021-02-24

## 2021-02-24 RX ADMIN — PROPOFOL 120 MG: 10 INJECTION, EMULSION INTRAVENOUS at 07:38

## 2021-02-24 RX ADMIN — SODIUM CHLORIDE 125 ML/HR: 0.9 INJECTION, SOLUTION INTRAVENOUS at 06:27

## 2021-02-24 RX ADMIN — PROPOFOL 20 MG: 10 INJECTION, EMULSION INTRAVENOUS at 07:42

## 2021-02-24 RX ADMIN — PROPOFOL 30 MG: 10 INJECTION, EMULSION INTRAVENOUS at 07:40

## 2021-02-24 NOTE — H&P
This is a 39 y o  female with a history of morbid obesity and There is no height or weight on file to calculate BMI  Here for an EGD to evaluate the anatomy of the GI tract  Physical Exam    /67   Pulse 96   Temp 98 2 °F (36 8 °C) (Temporal)   Resp 18   SpO2 96%    AAOx3  RRR  CTA B  Abdomen obese  Benign  A/P:    This is a 39 y o  female with a history of morbid obesity and There is no height or weight on file to calculate BMI       Will proceed with the EGD and biopsies        Ariana Solo MD  02/24/21  7:36 AM

## 2021-02-24 NOTE — ANESTHESIA PREPROCEDURE EVALUATION
Procedure:  EGD    Relevant Problems   ANESTHESIA (within normal limits)      CARDIO   (+) Mixed hyperlipidemia      ENDO   (+) Acquired hypothyroidism      GI/HEPATIC (within normal limits)      MUSCULOSKELETAL   (+) Neck muscle spasm      PULMONARY (within normal limits)      Other   (+) Morbid (severe) obesity due to excess calories [facility])        Physical Exam    Airway    Mallampati score: II  TM Distance: >3 FB  Neck ROM: full     Dental   No notable dental hx     Cardiovascular  Cardiovascular exam normal    Pulmonary  Pulmonary exam normal     Other Findings        Anesthesia Plan  ASA Score- 3     Anesthesia Type- IV sedation with anesthesia with ASA Monitors  Additional Monitors:   Airway Plan:           Plan Factors-    Chart reviewed  Induction- intravenous  Postoperative Plan-     Informed Consent- Anesthetic plan and risks discussed with patient

## 2021-02-24 NOTE — DISCHARGE INSTRUCTIONS
Gastritis   WHAT YOU NEED TO KNOW:   Gastritis is inflammation or irritation of the lining of your stomach  DISCHARGE INSTRUCTIONS:   Call 911 for any of the following:   · You develop chest pain or shortness of breath  Seek care immediately if:   · You vomit blood  · You have black or bloody bowel movements  · You have severe stomach or back pain  Contact your healthcare provider if:   · You have a fever  · You have new or worsening symptoms, even after treatment  · You have questions or concerns about your condition or care  Medicines:   · Medicines  may be given to help treat a bacterial infection or decrease stomach acid  · Take your medicine as directed  Contact your healthcare provider if you think your medicine is not helping or if you have side effects  Tell him or her if you are allergic to any medicine  Keep a list of the medicines, vitamins, and herbs you take  Include the amounts, and when and why you take them  Bring the list or the pill bottles to follow-up visits  Carry your medicine list with you in case of an emergency  Manage or prevent gastritis:   · Do not smoke  Nicotine and other chemicals in cigarettes and cigars can make your symptoms worse and cause lung damage  Ask your healthcare provider for information if you currently smoke and need help to quit  E-cigarettes or smokeless tobacco still contain nicotine  Talk to your healthcare provider before you use these products  · Do not drink alcohol  Alcohol can prevent healing and make your gastritis worse  Talk to your healthcare provider if you need help to stop drinking  · Do not take NSAIDs or aspirin unless directed  These and similar medicines can cause irritation  If your healthcare provider says it is okay to take NSAIDs, take them with food  · Do not eat foods that cause irritation  Foods such as oranges and salsa can cause burning or pain  Eat a variety of healthy foods  Examples include fruits (not citrus), vegetables, low-fat dairy products, beans, whole-grain breads, and lean meats and fish  Try to eat small meals, and drink water with your meals  Do not eat for at least 3 hours before you go to bed  · Find ways to relax and decrease stress  Stress can increase stomach acid and make gastritis worse  Activities such as yoga, meditation, or listening to music can help you relax  Spend time with friends, or do things you enjoy  Follow up with your healthcare provider as directed: You may need ongoing tests or treatment, or referral to a gastroenterologist  Write down your questions so you remember to ask them during your visits  © Copyright [address] Information is for End User's use only and may not be sold, redistributed or otherwise used for commercial purposes  All illustrations and images included in CareNotes® are the copyrighted property of A D A M , Inc  or [facility]   The above information is an  only  It is not intended as medical advice for individual conditions or treatments  Talk to your doctor, nurse or pharmacist before following any medical regimen to see if it is safe and effective for you  Hiatal Hernia   WHAT YOU NEED TO KNOW:   A hiatal hernia is a condition that causes part of your stomach to bulge through the hiatus (small opening) in your diaphragm  The part of the stomach may move up and down, or it may get trapped above the diaphragm  DISCHARGE INSTRUCTIONS:   Seek care immediately if:   · You have severe abdominal pain  · You try to vomit but nothing comes out (retching)  · You have severe chest pain and sudden trouble breathing  · Your bowel movements are black or bloody  · Your vomit looks like coffee grounds or has blood in it  Contact your healthcare provider if:   · Your symptoms are getting worse  · You have nausea, and you are vomiting      · You are losing weight without trying  · You have questions or concerns about your condition or care  Medicines:   · Medicines  may be given to relieve heartburn symptoms  These medicines help to decrease or block stomach acid  You may also be given medicines that help to tighten the esophageal sphincter  · Take your medicine as directed  Contact your healthcare provider if you think your medicine is not helping or if you have side effects  Tell him or her if you are allergic to any medicine  Keep a list of the medicines, vitamins, and herbs you take  Include the amounts, and when and why you take them  Bring the list or the pill bottles to follow-up visits  Carry your medicine list with you in case of an emergency  Follow up with your healthcare provider as directed:  Write down your questions so you remember to ask them during your visits  Self care:   · Avoid foods that make your symptoms worse  These may include spicy foods, fruit juices, alcohol, caffeine, chocolate, and mint  · Eat several small meals during the day  Small meals give your stomach less food to digest     · Avoid lying down and bending forward after you eat  Do not eat meals 2 to 3 hours before bedtime  This decreases your risk for reflux  · Maintain a healthy weight  If you are overweight, weight loss may help relieve your symptoms  · Sleep with your head elevated  at least 6 inches  · Do not smoke  Smoking can increase your symptoms of heartburn  © Copyright [address] Information is for End User's use only and may not be sold, redistributed or otherwise used for commercial purposes  All illustrations and images included in CareNotes® are the copyrighted property of [employer] , Inc  or [address] [place], [place] [address]alfonzo Duran   The above information is an  only  It is not intended as medical advice for individual conditions or treatments   Talk to your doctor, nurse or pharmacist before following any medical regimen to see if it is safe and effective for you  Upper Endoscopy   WHAT YOU NEED TO KNOW:   An upper endoscopy is also called an upper gastrointestinal (GI) endoscopy, or an esophagogastroduodenoscopy (EGD)  You may feel bloated, gassy, or have some abdominal discomfort after your procedure  Your throat may be sore for 24 to 36 hours  You may burp or pass gas from air that is still inside your body  DISCHARGE INSTRUCTIONS:   Call 911 for any of the following:   · You have sudden chest pain or trouble breathing  Seek care immediately if:   · You feel dizzy or faint  · You have trouble swallowing  · Your bowel movements are very dark or black  · Your abdomen is hard and firm and you have severe pain  · You vomit blood  Contact your healthcare provider if:   · You feel full or bloated and cannot burp or pass gas  · You have not had a bowel movement for 3 days after your procedure  · You have neck pain  · You have a fever or chills  · You have nausea or are vomiting  · You have a rash or hives  · You have questions or concerns about your endoscopy  Relieve a sore throat:  Suck on throat lozenges or crushed ice  Gargle with a small amount of warm salt water  Mix 1 teaspoon of salt and 1 cup of warm water to make salt water  Relieve gas and discomfort from bloating:  Lie on your right side with a heating pad on your abdomen  Take short walks to help pass gas  Eat small meals until bloating is relieved  Rest after your procedure: You have been given medicine to relax you  Do not  drive or make important decisions until the day after your procedure  Return to your normal activity as directed  You can usually return to work the day after your procedure  Follow up with your healthcare provider as directed:  Write down your questions so you remember to ask them during your visits     © 2017 [address] is for End User's use only and may not be sold, redistributed or otherwise used for commercial purposes  All illustrations and images included in CareNotes® are the copyrighted property of A D A M , Inc  or Rashard Cyr  The above information is an  only  It is not intended as medical advice for individual conditions or treatments  Talk to your doctor, nurse or pharmacist before following any medical regimen to see if it is safe and effective for you

## 2021-02-24 NOTE — ANESTHESIA POSTPROCEDURE EVALUATION
Post-Op Assessment Note    CV Status:  Stable    Pain management: adequate     Mental Status:  Alert and awake   Hydration Status:  Euvolemic   PONV Controlled:  Controlled   Airway Patency:  Patent   Two or more mitigation strategies used for obstructive sleep apnea   Post Op Vitals Reviewed: Yes      Staff: Anesthesiologist         No complications documented      BP      Temp      Pulse     Resp      SpO2

## 2021-02-25 ENCOUNTER — OFFICE VISIT (OUTPATIENT)
Dept: BARIATRICS | Facility: CLINIC | Age: 46
End: 2021-02-25

## 2021-02-25 VITALS — HEIGHT: 61 IN | WEIGHT: 272 LBS | BODY MASS INDEX: 51.35 KG/M2

## 2021-02-25 DIAGNOSIS — E66.01 MORBID (SEVERE) OBESITY DUE TO EXCESS CALORIES (HCC): Primary | ICD-10-CM

## 2021-02-25 PROCEDURE — RECHECK: Performed by: DIETITIAN, REGISTERED

## 2021-02-26 ENCOUNTER — TELEPHONE (OUTPATIENT)
Dept: BARIATRICS | Facility: CLINIC | Age: 46
End: 2021-02-26

## 2021-02-26 NOTE — TELEPHONE ENCOUNTER
Called patient on 2/25/21 and left a message to give the office a call to schedule her 4/4 wt ck appointment

## 2021-03-24 ENCOUNTER — OFFICE VISIT (OUTPATIENT)
Dept: BARIATRICS | Facility: CLINIC | Age: 46
End: 2021-03-24
Payer: COMMERCIAL

## 2021-03-24 VITALS
TEMPERATURE: 98.8 F | WEIGHT: 271.5 LBS | RESPIRATION RATE: 20 BRPM | BODY MASS INDEX: 51.26 KG/M2 | HEART RATE: 81 BPM | HEIGHT: 61 IN | DIASTOLIC BLOOD PRESSURE: 78 MMHG | SYSTOLIC BLOOD PRESSURE: 132 MMHG

## 2021-03-24 DIAGNOSIS — E03.9 ACQUIRED HYPOTHYROIDISM: ICD-10-CM

## 2021-03-24 DIAGNOSIS — E78.2 MIXED HYPERLIPIDEMIA: ICD-10-CM

## 2021-03-24 DIAGNOSIS — L40.9 PSORIASIS: ICD-10-CM

## 2021-03-24 DIAGNOSIS — Z01.818 PREOPERATIVE TESTING: ICD-10-CM

## 2021-03-24 DIAGNOSIS — E66.01 MORBID (SEVERE) OBESITY DUE TO EXCESS CALORIES (HCC): Primary | ICD-10-CM

## 2021-03-24 PROCEDURE — 99214 OFFICE O/P EST MOD 30 MIN: CPT | Performed by: PHYSICIAN ASSISTANT

## 2021-03-24 PROCEDURE — 3008F BODY MASS INDEX DOCD: CPT | Performed by: PHYSICIAN ASSISTANT

## 2021-03-24 PROCEDURE — 1036F TOBACCO NON-USER: CPT | Performed by: PHYSICIAN ASSISTANT

## 2021-03-24 NOTE — PROGRESS NOTES
Assessment/Plan:    Diagnoses and all orders for this visit:    Morbid (severe) obesity due to excess calories ([address] )    Acquired hypothyroidism    Mixed hyperlipidemia    Psoriasis       Interested in Vadim-En-Y Gastric Bypass with Dr Rosy Pantoja  10% Weight loss-Results pending ; gave patient calorie range of 5878-9335 per day; add 100 calories on exercise days  Sample menus also provided   Screening labs-TSH and lipid complete 2/2021; will need repeat cbc and cmp will order   Support Group-Not Completed  Cardiac Risk Assessment-Not Completed  Upper Endoscopy-Completed; H  Pylori biopsy-Negative  Sleep Medicine Assessment-Not applicable  Alcohol and nicotine use is not recommended following bariatric surgery  NSAIDs should be avoided following bariatric surgery  Advised that pregnancy should be avoided following bariatric surgery for 12-18 months and should not solely rely on OCP and consider additional/alternative sources for contraception due to potential absorption issues-Completed    Follow up in approximately 1 month with RD   Goals:  Food log (ie ) [http://www  Attendify,sparkpeople  com,loseit com,calorieking  com,etc]www  myfitnesspal com,sparkpeople  com,loseit com,calorieking  com,etc  baritastic  No sugary beverages  At least 64oz of water daily  Increase physical activity by 10 minutes daily   Gradually increase physical activity to a goal of 5 days per week for 30 minutes of MODERATE intensity PLUS 2 days per week of FULL BODY resistance training  Follow lessons 1-6 in the manual  Follow the 30/60 minute rule  Goal protein intake of 60-80 grams per day  Alcohol and nicotine use is not recommended following bariatric surgery  NSAIDs (Motrin, Advil, Aleve, Naproxen, ibuprofen, etc) should be avoided following bariatric surgery)    Subjective:   Chief Complaint   Patient presents with    Weight Check     4/4 wt check     Patient ID: Stanley Jimenez is a 39 y o  female with excess weight/obesity here to pursue weight management  Past Medical History:   Diagnosis Date    Anxiety     Asthma     Disease of thyroid gland     hypo    Morbid obesity (HCC)     Psoriasis     Skin disorder     Vertigo      HPI:  The patient has been:  Following the lessons in the manual- yes  Practicing the 30/60 minute rule- no  Food logging- no  Exercise- tracking steps   Alcohol- no  Tobacco- no    The following portions of the patient's history were reviewed and updated as appropriate: allergies, current medications, past family history, past medical history, past social history, past surgical history and problem list   Review of Systems   Constitutional: Negative  Respiratory: Negative  Cardiovascular: Negative  Gastrointestinal: Negative  Musculoskeletal: Positive for arthralgias (hx psoriasis)  Skin: Positive for rash (hs psoriasis)  Neurological: Negative  Psychiatric/Behavioral: Negative  Objective:  /78 (BP Location: Right arm, Patient Position: Sitting, Cuff Size: Standard)   Pulse 81   Temp 98 8 °F (37 1 °C) (Tympanic)   Resp 20   Ht 5' 0 7" (1 542 m)   Wt 123 kg (271 lb 8 oz)   BMI 51 81 kg/m²   Physical Exam  Vitals signs and nursing note reviewed  Constitutional:       Appearance: Normal appearance  She is obese  HENT:      Head: Normocephalic and atraumatic  Eyes:      Extraocular Movements: Extraocular movements intact  Pupils: Pupils are equal, round, and reactive to light  Neck:      Musculoskeletal: Normal range of motion  Cardiovascular:      Rate and Rhythm: Normal rate and regular rhythm  Pulmonary:      Effort: Pulmonary effort is normal       Breath sounds: Normal breath sounds  Abdominal:      General: Bowel sounds are normal    Musculoskeletal: Normal range of motion  Skin:     General: Skin is warm and dry  Neurological:      General: No focal deficit present  Mental Status: She is alert and oriented to person, place, and time     Psychiatric: Mood and Affect: Mood normal          Behavior: Behavior normal

## 2021-03-24 NOTE — PATIENT INSTRUCTIONS
Goals:  - Learn to eat protein first at every meal and include protein in every snack    - Start to decrease portion sizes  - Limit eating out at restaurants  - Start to increase fruits, vegetables, and whole grains in the diet and decrease desserts/ candy/ high fat and processed foods   - Decrease caffeinated and/or carbonated beverages (16oz of coffee allowed per day)  - Practice the 30/60 minute rule  Stop drinking 30 minutes prior to your meal, with you meal, and for 60 minutes after your meal   - Drink at least 64oz of water or other calorie free beverage per day  - Practice sipping beverages slowly  - Practice chewing your foods thoroughly-- to liquid consistency-- before swallowing   - Decrease/limit alcohol intake  - Keep a food journal   - Start taking a general multivitamin   - You should exercise for at least 30 minutes, 5 days a week       4210-1016 calories a day and add 100 calories on exercise days

## 2021-03-26 DIAGNOSIS — Z23 ENCOUNTER FOR IMMUNIZATION: ICD-10-CM

## 2021-04-28 ENCOUNTER — OFFICE VISIT (OUTPATIENT)
Dept: BARIATRICS | Facility: CLINIC | Age: 46
End: 2021-04-28

## 2021-04-28 VITALS — BODY MASS INDEX: 51.58 KG/M2 | HEIGHT: 61 IN | WEIGHT: 273.2 LBS

## 2021-04-28 DIAGNOSIS — E66.01 OBESITY, CLASS III, BMI 40-49.9 (MORBID OBESITY) (HCC): Primary | ICD-10-CM

## 2021-04-28 PROCEDURE — 3008F BODY MASS INDEX DOCD: CPT | Performed by: PHYSICIAN ASSISTANT

## 2021-04-28 PROCEDURE — RECHECK: Performed by: DIETITIAN, REGISTERED

## 2021-04-28 NOTE — PROGRESS NOTES
Bariatric Nutrition Assessment Note    Type of surgery    Preop  Surgery Date: TBD- patient completed 6 month medical weight management program  Patient is here today for 4/4 weight check and dietary counseling      Surgeon: Dr Leo Phelps  39 y o   female     Wt with BMI of 25: 130 7lbs  Pre-Op Excess Wt: 132 3lbs  Ht 5' 0 7" (1 542 m)   Wt 124 kg (273 lb 3 2 oz)   BMI 52 13 kg/m²   Gained 2# since last appointment, up 10 1# since starting the program     5% weight loss GW: 257 to schedule surgery    [facility] Equation:   1791 kcal   Estimated calories for weight loss 3985-9347 kcal ( 1-2# per wk wt loss - sedentary )  Estimated protein needs 60-72 grams (1 0-1 2 gms/kg IBW )   Estimated fluid needs 2080 ml (35 ml/kg IBW )   70 ounces per day     Weight History   Onset of Obesity: Childhood  Family history of obesity: Yes  Wt Loss Attempts: Commercial Programs ([employer], Chantal Mini, etc )  FAD Diets (Cabbage soup, Grapefruit, Cleanse, etc )  High Protein/Low CHO diets (Atkins, Union, etc )  Meal Replacements (Sage Numbers Fast, etc )  Nutrition Counseling with RD  OTC meds/supplements  Self Created Diets (Portion Control, Healthy Food Choices, etc )  Medical weight loss program   Maximum Wt Lost: 40lbs    Review of History and Medications   Past Medical History:   Diagnosis Date    Anxiety     Asthma     Disease of thyroid gland     hypo    Morbid obesity ([address] )     Psoriasis     Skin disorder     Vertigo      Past Surgical History:   Procedure Laterality Date     SECTION   &     2    KNEE SURGERY      SC EGD TRANSORAL BIOPSY SINGLE/MULTIPLE N/A 2018    Procedure: ESOPHAGOGASTRODUODENOSCOPY (EGD) with bx;  Surgeon: Xuan Kirk MD;  Location: [facility];   Service: Bariatrics     Social History     Socioeconomic History    Marital status: /Civil Union     Spouse name: Not on file    Number of children: 2    Years of education: Not on file    Highest education level: Not on file   Occupational History    Occupation: Employed   Social Needs    Financial resource strain: Not on file    Food insecurity     Worry: Not on file     Inability: Not on file   [employer] needs     Medical: Not on file     Non-medical: Not on file   Tobacco Use    Smoking status: Never Smoker    Smokeless tobacco: Never Used   Substance and Sexual Activity    Alcohol use: Yes     Frequency: Monthly or less     Comment: occasional    Drug use: No    Sexual activity: Yes     Partners: Male     Birth control/protection: None   Lifestyle    Physical activity     Days per week: Not on file     Minutes per session: Not on file    Stress: Not on file   Relationships    Social connections     Talks on phone: Not on file     Gets together: Not on file     Attends Yazdanism service: Not on file     Active member of club or organization: Not on file     Attends meetings of clubs or organizations: Not on file     Relationship status: Not on file    Intimate partner violence     Fear of current or ex partner: Not on file     Emotionally abused: Not on file     Physically abused: Not on file     Forced sexual activity: Not on file   Other Topics Concern    Not on file   Social History Narrative    Always uses seat belt    [employer]    Lives in [address] [place] [address] Rd with children    Lives with spouse    No advance directives    No caffeine use    No Living will    Supportive and safe               Current Outpatient Medications:     clobetasol (TEMOVATE) 0 05 % cream, APPLY TOPICALLY 2 (TWO) TIMES A DAY, Disp: 60 g, Rfl: 3    cyclobenzaprine (FLEXERIL) 10 mg tablet, TAKE 1 TABLET (10 MG TOTAL) BY MOUTH DAILY AT BEDTIME AS NEEDED FOR MUSCLE SPASMS, Disp: 30 tablet, Rfl: 3    dimenhyDRINATE (DRAMAMINE) 50 mg tablet, Take 50 mg by mouth 2 (two) times a day as needed for movement disorders, Disp: , Rfl:     levothyroxine 112 mcg tablet, Take 1 tablet (112 mcg total) by mouth daily in the early morning, Disp: 90 tablet, Rfl: 0    Secukinumab (COSENTYX SC), Inject under the skin, Disp: , Rfl:     simvastatin (ZOCOR) 40 mg tablet, Take 1 tablet (40 mg total) by mouth daily at bedtime, Disp: 90 tablet, Rfl: 3    topiramate (TOPAMAX) 25 mg tablet, Take 1 tablet (25 mg total) by mouth daily at bedtime (Patient not taking: Reported on 10/21/2020), Disp: 30 tablet, Rfl: 1  Food Intake and Lifestyle Assessment   Food Intake Assessment completed via usual diet recall  Wake up: 6amBed: 10pm    7am Breakfast: meal replacement shake with almond milk and fruit( Baritric Advantage ) or eggs ( on weekends)   10:30am:  Lunch : packed leftovers from night before: baked chicken drumstick with side salad (yesterday quinoa with chicken and avocado and chick peas)  2pm Snack: pre portioned nuts or 70 calorie biscuits, sometimes candy from vending machine   6pm Dinner: see lunch (does pick while cooking)   Snack: 2 cookies brought by    Beverage intake: water and sugar free beverages and one cup coffee (2% milk, 2 tsp sugar) per day  Protein supplement: Protein powder Bariatric Advantage  Estimated protein intake per day: 60-80g  Estimated fluid intake per day: 32-64oz, water, 8oz coffee, 8oz tea, 8oz juice, 6oz wine  Meals eaten away from home: been avoiding due to stomach issues  Typical meal pattern: 3 meals per day and 1-2 snacks per day  Eating Behaviors: Consumption of high calorie/ high fat foods and beverages  Food allergies or intolerances: No Known Allergies  Cultural or Latter day considerations:   Physical Assessment  Physical Activity  Types of exercise: no walking due to severe arthritic pain  Gets very still by end of the day   Current physical limitations: knee pain    Psychosocial Assessment   Support systems: spouse and children    Spouse had bariatric surgery   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, 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     Nutrition Diagnosis- continued   Diagnosis: Overweight / Obesity (NC-3 3) and Excessive energy intake (NI-1 5)  Related to: Physical inactivity and Excessive energy intake  As Evidenced by: BMI >25 and Excessive energy intake     Nutrition Prescription: Recommend the following diet  Regular    Interventions and Teaching   Discussed pre-op and post-op nutrition guidelines  Patient educated and handouts provided  Surgical changes to stomach / GI  Capacity of post-surgery stomach  Diet progression  Adequate hydration  Sugar and fat restriction to decrease "dumping syndrome"  Expected weight loss  Exercise  Suggestions for pre-op diet  Nutrition considerations after surgery  Protein supplements  Meal planning and preparation  Appropriate carbohydrate, protein, and fat intake, and food/fluid choices to maximize safe weight loss, nutrient intake, and tolerance   Dietary and lifestyle changes  Possible problems with poor eating habits  Techniques for self monitoring and keeping daily food journal  Potential for food intolerance after surgery, and ways to deal with them including: lactose intolerance, nausea, reflux, vomiting, diarrhea, food intolerance, appetite changes, gas  Vitamin / Mineral supplementation of Multivitamin with minerals and Vitamin D  Patient has had tubal ligation     Education provided to: patient    Barriers to learning: No barriers identified    Readiness to change: preparation    Prior research on procedure: discussed with provider and pre-op class    Comprehension: verbalizes understanding     Expected Compliance: good  Workflow:   PCP Letter: done    Support Group: emailed info   6 Month Pre-Operative Program: done  Nathanael: ordered    Cardiac Risk Assessment: 5/26/2021   Sleep Studies: N/A   EGD done    Weight Loss: gained 10#   Psych/D+A/Nicotine? N/A     Pt is an appropriate candidate for surgery  Yes    Evaluation / Monitoring  Dietitian to Monitor: Eating pattern as discussed Tolerance of nutrition prescription Body weight Lab values Physical activity  Patient has gained 10 pounds since starting the program   She attributes some of the weight gain to her arthritic pain  She has become less mobile, whenever she sits her knee "freezes' up and she has difficult moving  She has also been eating to help with pain, and she has a discussion with her  to stop brining sweets/cookies into the home, which stopped 2 weeks ago  She has switched to bringing pre portioned snacks to work, but has some indiscretions with candy from vending machine  She has eliminated all sweetened beverages from her diet  She is taking her vitamins and minerals as recommended  She is not consistently food logging at this point    Frustrated with weight gain and would like to try a low calorie diet to help lose weight faster     Goals  Food journal, Exercise 30 minutes 5 times per week, Complete lession plans 1-6, Eat 3 meals per day and Eliminate mindless snacking   Meal Plan   B  Protein shake mixed with 30 calorie almond milk - if fruit is added measure to 1/4 cup   L:  Dinner left overs - lean protein and vegetables or salad   S:  Pre portioned nuts or other snack - <200 calories  D:  Protein shake mixed with 30 calorie almond milk   S:  SF jello or SF popsicles  Complete support group requirement   F/U in one month for pre op weight check       Time Spent:   30 minutes

## 2021-05-26 ENCOUNTER — OFFICE VISIT (OUTPATIENT)
Dept: CARDIOLOGY CLINIC | Facility: CLINIC | Age: 46
End: 2021-05-26
Payer: COMMERCIAL

## 2021-05-26 VITALS
BODY MASS INDEX: 53.4 KG/M2 | HEART RATE: 87 BPM | SYSTOLIC BLOOD PRESSURE: 128 MMHG | WEIGHT: 272 LBS | HEIGHT: 60 IN | DIASTOLIC BLOOD PRESSURE: 74 MMHG

## 2021-05-26 DIAGNOSIS — E66.01 MORBID (SEVERE) OBESITY DUE TO EXCESS CALORIES (HCC): ICD-10-CM

## 2021-05-26 DIAGNOSIS — Z01.810 PREOPERATIVE CARDIOVASCULAR EXAMINATION: Primary | ICD-10-CM

## 2021-05-26 DIAGNOSIS — E03.9 ACQUIRED HYPOTHYROIDISM: ICD-10-CM

## 2021-05-26 DIAGNOSIS — E78.2 MIXED HYPERLIPIDEMIA: ICD-10-CM

## 2021-05-26 PROCEDURE — 93000 ELECTROCARDIOGRAM COMPLETE: CPT | Performed by: INTERNAL MEDICINE

## 2021-05-26 PROCEDURE — 99243 OFF/OP CNSLTJ NEW/EST LOW 30: CPT | Performed by: INTERNAL MEDICINE

## 2021-05-26 NOTE — LETTER
May 27, 2021     Luciana Ganser, [address]    Patient: Glory Loja   YOB: 1975   Date of Visit: 5/26/2021       Dear Dr Lalita Galvez: Thank you for referring Glory Loja to me for evaluation  Below are my notes for this consultation  If you have questions, please do not hesitate to call me  I look forward to following your patient along with you  Sincerely,        Jose De Jesus Stinson MD        CC: MD Morris Greenwood MD Donalda Schatz, MD  5/27/2021  5:32 AM  Sign when Signing Visit                                            Cardiology Consultation     Glory Loja  [id number]  1975  [address]  [address] [facility] [address]      1  Preoperative cardiovascular examination  POCT ECG   2  Mixed hyperlipidemia     3  Morbid (severe) obesity due to excess calories (HCC)  POCT ECG   4  Acquired hypothyroidism         02/17/2021 lipid profile: Cholesterol 305, triglycerides 304, HDL 56, LDL calculated 188, non HDL cholesterol 249  HPI:Patient is a 77-year-old female who was sent for preoperative cardiovascular examination prior to bariatric surgery  She has no cardiac history  She planned bariatric surgery a year ago but it was canceled due to untreated hypothyroidism  Presently she is still has slightly hypothyroid but much improved  She denies chest discomfort, shortness of breath, palpitations, leg edema or symptoms of near-syncope/syncope  She is a lifetime nonsmoker  She does have mixed hyperlipidemia  Her hyperlipidemia will most likely improve with weight loss  She has no family history of coronary artery disease    Discussion/Summary    1  Patient may undergo bariatric surgery as planned  2   Patient has no cardiac risk factors her for surgery    Patient Active Problem List   Diagnosis    Mixed hyperlipidemia    Acquired hypothyroidism    Psoriasis    Morbid (severe) obesity due to excess calories (HCC)    Morbid obesity (HCC)    Leukocytosis    Neck muscle spasm    Vertigo    Preoperative cardiovascular examination     Past Medical History:   Diagnosis Date    Anxiety     Asthma     Disease of thyroid gland     hypo    Morbid obesity (HCC)     Psoriasis     Skin disorder     Vertigo      Social History     Socioeconomic History    Marital status: /Civil Union     Spouse name: Not on file    Number of children: 2    Years of education: Not on file    Highest education level: Not on file   Occupational History    Occupation: Employed   Social Needs    Financial resource strain: Not on file    Food insecurity     Worry: Not on file     Inability: Not on file   [employer] needs     Medical: Not on file     Non-medical: Not on file   Tobacco Use    Smoking status: Never Smoker    Smokeless tobacco: Never Used   Substance and Sexual Activity    Alcohol use: Yes     Frequency: Monthly or less     Comment: occasional    Drug use: No    Sexual activity: Yes     Partners: Male     Birth control/protection: None   Lifestyle    Physical activity     Days per week: Not on file     Minutes per session: Not on file    Stress: Not on file   Relationships    Social connections     Talks on phone: Not on file     Gets together: Not on file     Attends Yazidism service: Not on file     Active member of club or organization: Not on file     Attends meetings of clubs or organizations: Not on file     Relationship status: Not on file    Intimate partner violence     Fear of current or ex partner: Not on file     Emotionally abused: Not on file     Physically abused: Not on file     Forced sexual activity: Not on file   Other Topics Concern    Not on file   Social History Narrative    Always uses seat belt    [employer]    Lives in private house    Lives with children    Lives with spouse    No advance directives    No caffeine use    No Living will    Supportive and safe              Family History   Problem Relation Age of Onset    No Known Problems Mother     Hypertension Father     Stomach cancer Family     Obesity Family      Past Surgical History:   Procedure Laterality Date     SECTION   &     2    KNEE SURGERY      [place] EGD TRANSORAL BIOPSY SINGLE/MULTIPLE N/A 2018    Procedure: ESOPHAGOGASTRODUODENOSCOPY (EGD) with bx;  Surgeon: Xuan Kirk MD;  Location: [facility]; Service: Bariatrics       Current Outpatient Medications:     clobetasol (TEMOVATE) 0 05 % cream, APPLY TOPICALLY 2 (TWO) TIMES A DAY, Disp: 60 g, Rfl: 3    cyclobenzaprine (FLEXERIL) 10 mg tablet, TAKE 1 TABLET (10 MG TOTAL) BY MOUTH DAILY AT BEDTIME AS NEEDED FOR MUSCLE SPASMS, Disp: 30 tablet, Rfl: 3    dimenhyDRINATE (DRAMAMINE) 50 mg tablet, Take 50 mg by mouth 2 (two) times a day as needed for movement disorders, Disp: , Rfl:     levothyroxine 112 mcg tablet, Take 1 tablet (112 mcg total) by mouth daily in the early morning, Disp: 90 tablet, Rfl: 0    Secukinumab (COSENTYX SC), Inject under the skin, Disp: , Rfl:     simvastatin (ZOCOR) 40 mg tablet, Take 1 tablet (40 mg total) by mouth daily at bedtime, Disp: 90 tablet, Rfl: 3    topiramate (TOPAMAX) 25 mg tablet, Take 1 tablet (25 mg total) by mouth daily at bedtime (Patient not taking: Reported on 10/21/2020), Disp: 30 tablet, Rfl: 1  No Known Allergies    Results for orders placed or performed in visit on 21   POCT ECG    Narrative    Normal sinus rhythm at a rate 87 beats per minute   Incomplete right bundle branch block pattern  Low voltage  Abnormal EKG           Review of Systems:  Review of Systems   Constitutional: Negative  HENT: Negative  Respiratory: Negative for chest tightness and shortness of breath  Cardiovascular: Negative for chest pain and leg swelling  Gastrointestinal: Negative  Endocrine: Negative      Genitourinary: Negative  Musculoskeletal: Negative  Skin: Negative  Allergic/Immunologic: Negative  Neurological: Negative  Hematological: Negative  Psychiatric/Behavioral: Negative  Physical Exam:  /74   Pulse 87   Ht 5' (1 524 m)   Wt 123 kg (272 lb)   BMI 53 12 kg/m²   Physical Exam  Constitutional:       Appearance: She is well-developed  HENT:      Head: Normocephalic and atraumatic  Neck:      Musculoskeletal: Normal range of motion and neck supple  Thyroid: No thyromegaly  Vascular: No JVD  Trachea: No tracheal deviation  Cardiovascular:      Rate and Rhythm: Normal rate and regular rhythm  Heart sounds: Normal heart sounds  No murmur  No friction rub  No gallop  Pulmonary:      Effort: Pulmonary effort is normal  No respiratory distress  Breath sounds: No rales  Abdominal:      General: Bowel sounds are normal       Palpations: Abdomen is soft  Musculoskeletal: Normal range of motion  Skin:     General: Skin is warm and dry  Neurological:      Mental Status: She is alert and oriented to person, place, and time     Psychiatric:         Behavior: Behavior normal          ======================================================  Lab Results   Component Value Date    WBC 19 48 (H) 09/16/2020    HGB 12 5 09/16/2020    HCT 39 5 09/16/2020    MCV 91 09/16/2020     (H) 09/16/2020      Lab Results   Component Value Date    SODIUM 138 09/16/2020    K 3 8 09/16/2020     09/16/2020    CO2 24 09/16/2020    BUN 11 09/16/2020    CREATININE 0 70 09/16/2020    CALCIUM 9 4 09/16/2020      No results found for: HGBA1C   No results found for: CHOL  Lab Results   Component Value Date    HDL 56 02/17/2021    HDL 54 11/12/2019    HDL 58 06/29/2017     Lab Results   Component Value Date    LDLCALC 188 (H) 02/17/2021    LDLCALC 174 (H) 11/12/2019    LDLCALC 180 (H) 06/29/2017     Lab Results   Component Value Date    TRIG 304 (H) 02/17/2021    TRIG 203 (H) 11/12/2019    TRIG 229 (H) 06/29/2017

## 2021-05-27 NOTE — PROGRESS NOTES
Cardiology Consultation     Nikita Teran  [id number]  1975  [address] [facility] [address]      1  Preoperative cardiovascular examination  POCT ECG   2  Mixed hyperlipidemia     3  Morbid (severe) obesity due to excess calories (HCC)  POCT ECG   4  Acquired hypothyroidism         02/17/2021 lipid profile: Cholesterol 305, triglycerides 304, HDL 56, LDL calculated 188, non HDL cholesterol 249  HPI:Patient is a 55-year-old female who was sent for preoperative cardiovascular examination prior to bariatric surgery  She has no cardiac history  She planned bariatric surgery a year ago but it was canceled due to untreated hypothyroidism  Presently she is still has slightly hypothyroid but much improved  She denies chest discomfort, shortness of breath, palpitations, leg edema or symptoms of near-syncope/syncope  She is a lifetime nonsmoker  She does have mixed hyperlipidemia  Her hyperlipidemia will most likely improve with weight loss  She has no family history of coronary artery disease    Discussion/Summary    1  Patient may undergo bariatric surgery as planned  2   Patient has no cardiac risk factors her for surgery    Patient Active Problem List   Diagnosis    Mixed hyperlipidemia    Acquired hypothyroidism    Psoriasis    Morbid (severe) obesity due to excess calories (HCC)    Morbid obesity (HCC)    Leukocytosis    Neck muscle spasm    Vertigo    Preoperative cardiovascular examination     Past Medical History:   Diagnosis Date    Anxiety     Asthma     Disease of thyroid gland     hypo    Morbid obesity ([address] )     Psoriasis     Skin disorder     Vertigo      Social History     Socioeconomic History    Marital status: /Civil Union     Spouse name: Not on file    Number of children: 2    Years of education: Not on file    Highest education level: Not on file Occupational History    Occupation: Employed   Social Needs    Financial resource strain: Not on file    Food insecurity     Worry: Not on file     Inability: Not on file   [employer] needs     Medical: Not on file     Non-medical: Not on file   Tobacco Use    Smoking status: Never Smoker    Smokeless tobacco: Never Used   Substance and Sexual Activity    Alcohol use: Yes     Frequency: Monthly or less     Comment: occasional    Drug use: No    Sexual activity: Yes     Partners: Male     Birth control/protection: None   Lifestyle    Physical activity     Days per week: Not on file     Minutes per session: Not on file    Stress: Not on file   Relationships    Social connections     Talks on phone: Not on file     Gets together: Not on file     Attends [employer] service: Not on file     Active member of club or organization: Not on file     Attends meetings of clubs or organizations: Not on file     Relationship status: Not on file    Intimate partner violence     Fear of current or ex partner: Not on file     Emotionally abused: Not on file     Physically abused: Not on file     Forced sexual activity: Not on file   Other Topics Concern    Not on file   Social History Narrative    Always uses seat belt    [employer]    Lives in private house    Lives with children    Lives with spouse    No advance directives    No caffeine use    No Living will    Supportive and safe              Family History   Problem Relation Age of Onset    No Known Problems Mother     Hypertension Father     Stomach cancer Family     Obesity Family      Past Surgical History:   Procedure Laterality Date     SECTION   &     2    KNEE SURGERY      [place] EGD TRANSORAL BIOPSY SINGLE/MULTIPLE N/A 2018    Procedure: ESOPHAGOGASTRODUODENOSCOPY (EGD) with bx;  Surgeon: Lasandra Lefort, MD;  Location: [facility];   Service: Bariatrics       Current Outpatient Medications:    clobetasol (TEMOVATE) 0 05 % cream, APPLY TOPICALLY 2 (TWO) TIMES A DAY, Disp: 60 g, Rfl: 3    cyclobenzaprine (FLEXERIL) 10 mg tablet, TAKE 1 TABLET (10 MG TOTAL) BY MOUTH DAILY AT BEDTIME AS NEEDED FOR MUSCLE SPASMS, Disp: 30 tablet, Rfl: 3    dimenhyDRINATE (DRAMAMINE) 50 mg tablet, Take 50 mg by mouth 2 (two) times a day as needed for movement disorders, Disp: , Rfl:     levothyroxine 112 mcg tablet, Take 1 tablet (112 mcg total) by mouth daily in the early morning, Disp: 90 tablet, Rfl: 0    Secukinumab (COSENTYX SC), Inject under the skin, Disp: , Rfl:     simvastatin (ZOCOR) 40 mg tablet, Take 1 tablet (40 mg total) by mouth daily at bedtime, Disp: 90 tablet, Rfl: 3    topiramate (TOPAMAX) 25 mg tablet, Take 1 tablet (25 mg total) by mouth daily at bedtime (Patient not taking: Reported on 10/21/2020), Disp: 30 tablet, Rfl: 1  No Known Allergies    Results for orders placed or performed in visit on 05/26/21   POCT ECG    Narrative    Normal sinus rhythm at a rate 87 beats per minute   Incomplete right bundle branch block pattern  Low voltage  Abnormal EKG           Review of Systems:  Review of Systems   Constitutional: Negative  HENT: Negative  Respiratory: Negative for chest tightness and shortness of breath  Cardiovascular: Negative for chest pain and leg swelling  Gastrointestinal: Negative  Endocrine: Negative  Genitourinary: Negative  Musculoskeletal: Negative  Skin: Negative  Allergic/Immunologic: Negative  Neurological: Negative  Hematological: Negative  Psychiatric/Behavioral: Negative  Physical Exam:  /74   Pulse 87   Ht 5' (1 524 m)   Wt 123 kg (272 lb)   BMI 53 12 kg/m²   Physical Exam  Constitutional:       Appearance: She is well-developed  HENT:      Head: Normocephalic and atraumatic  Neck:      Musculoskeletal: Normal range of motion and neck supple  Thyroid: No thyromegaly  Vascular: No JVD        Trachea: No tracheal deviation  Cardiovascular:      Rate and Rhythm: Normal rate and regular rhythm  Heart sounds: Normal heart sounds  No murmur  No friction rub  No gallop  Pulmonary:      Effort: Pulmonary effort is normal  No respiratory distress  Breath sounds: No rales  Abdominal:      General: Bowel sounds are normal       Palpations: Abdomen is soft  Musculoskeletal: Normal range of motion  Skin:     General: Skin is warm and dry  Neurological:      Mental Status: She is alert and oriented to person, place, and time     Psychiatric:         Behavior: Behavior normal          ======================================================  Lab Results   Component Value Date    WBC 19 48 (H) 09/16/2020    HGB 12 5 09/16/2020    HCT 39 5 09/16/2020    MCV 91 09/16/2020     (H) 09/16/2020      Lab Results   Component Value Date    SODIUM 138 09/16/2020    K 3 8 09/16/2020     09/16/2020    CO2 24 09/16/2020    BUN 11 09/16/2020    CREATININE 0 70 09/16/2020    CALCIUM 9 4 09/16/2020      No results found for: HGBA1C   No results found for: CHOL  Lab Results   Component Value Date    HDL 56 02/17/2021    HDL 54 11/12/2019    HDL 58 06/29/2017     Lab Results   Component Value Date    LDLCALC 188 (H) 02/17/2021    LDLCALC 174 (H) 11/12/2019    LDLCALC 180 (H) 06/29/2017     Lab Results   Component Value Date    TRIG 304 (H) 02/17/2021    TRIG 203 (H) 11/12/2019    TRIG 229 (H) 06/29/2017

## 2021-06-02 ENCOUNTER — OFFICE VISIT (OUTPATIENT)
Dept: BARIATRICS | Facility: CLINIC | Age: 46
End: 2021-06-02

## 2021-06-02 VITALS — HEIGHT: 61 IN | WEIGHT: 273.1 LBS | BODY MASS INDEX: 51.56 KG/M2

## 2021-06-02 DIAGNOSIS — E66.01 MORBID (SEVERE) OBESITY DUE TO EXCESS CALORIES (HCC): Primary | ICD-10-CM

## 2021-06-02 PROCEDURE — RECHECK: Performed by: DIETITIAN, REGISTERED

## 2021-06-02 PROCEDURE — 3008F BODY MASS INDEX DOCD: CPT | Performed by: INTERNAL MEDICINE

## 2021-06-02 NOTE — PROGRESS NOTES
Bariatric Nutrition Assessment Note    Type of surgery    Preop  Surgery Date: TBD- patient completed 6 month medical weight management program  Patient is here today for  weight check and dietary counseling      Surgeon: Dr Reji Frank  39 y o   female     Wt with BMI of 25: 130 7lbs  Pre-Op Excess Wt: 132 3lbs  Ht 5' 0 7" (1 542 m)   Wt 124 kg (273 lb 1 6 oz)   BMI 52 11 kg/m²   Weight stable x 1 month, 10 1# since starting the program     5% weight loss GW: 257 to schedule surgery    [place] [facility]  [place] Deep Equation:   1791 kcal   Estimated calories for weight loss 0668-8691 kcal ( 1-2# per wk wt loss - sedentary )  Estimated protein needs 60-72 grams (1 0-1 2 gms/kg IBW )   Estimated fluid needs 2080 ml (35 ml/kg IBW )   70 ounces per day     Weight History   Onset of Obesity: Childhood  Family history of obesity: Yes  Wt Loss Attempts: Commercial Programs ([email or web address], Deyvi Slain, etc )  FAD Diets (Cabbage soup, Grapefruit, Cleanse, etc )  High Protein/Low CHO diets (Atkins, Union, etc )  Meal Replacements (Asaf Titus Fast, etc )  Nutrition Counseling with RD  OTC meds/supplements  Self Created Diets (Portion Control, Healthy Food Choices, etc )  Medical weight loss program   Maximum Wt Lost: 40lbs    Review of History and Medications   Past Medical History:   Diagnosis Date    Anxiety     Asthma     Disease of thyroid gland     hypo    Morbid obesity ([address] )     Psoriasis     Skin disorder     Vertigo      Past Surgical History:   Procedure Laterality Date     SECTION   &     2    KNEE SURGERY      SD EGD TRANSORAL BIOPSY SINGLE/MULTIPLE N/A 2018    Procedure: ESOPHAGOGASTRODUODENOSCOPY (EGD) with bx;  Surgeon: Anne Marie Simmons MD;  Location: [facility];   Service: Bariatrics     Social History     Socioeconomic History    Marital status: /Civil Union     Spouse name: Not on file    Number of children: 2    Years of education: Not on file    Highest education level: Not on file   Occupational History    Occupation: Employed   Social Needs    Financial resource strain: Not on file    Food insecurity     Worry: Not on file     Inability: Not on file   [employer] needs     Medical: Not on file     Non-medical: Not on file   Tobacco Use    Smoking status: Never Smoker    Smokeless tobacco: Never Used   Substance and Sexual Activity    Alcohol use: Yes     Frequency: Monthly or less     Comment: occasional    Drug use: No    Sexual activity: Yes     Partners: Male     Birth control/protection: None   Lifestyle    Physical activity     Days per week: Not on file     Minutes per session: Not on file    Stress: Not on file   Relationships    Social connections     Talks on phone: Not on file     Gets together: Not on file     Attends Restorationist service: Not on file     Active member of club or organization: Not on file     Attends meetings of clubs or organizations: Not on file     Relationship status: Not on file    Intimate partner violence     Fear of current or ex partner: Not on file     Emotionally abused: Not on file     Physically abused: Not on file     Forced sexual activity: Not on file   Other Topics Concern    Not on file   Social History Narrative    Always uses seat belt    [employer]    Lives in private house    Lives with children    Lives with spouse    No advance directives    No caffeine use    No Living will    Supportive and safe               Current Outpatient Medications:     clobetasol (TEMOVATE) 0 05 % cream, APPLY TOPICALLY 2 (TWO) TIMES A DAY, Disp: 60 g, Rfl: 3    cyclobenzaprine (FLEXERIL) 10 mg tablet, TAKE 1 TABLET (10 MG TOTAL) BY MOUTH DAILY AT BEDTIME AS NEEDED FOR MUSCLE SPASMS, Disp: 30 tablet, Rfl: 3    dimenhyDRINATE (DRAMAMINE) 50 mg tablet, Take 50 mg by mouth 2 (two) times a day as needed for movement disorders, Disp: , Rfl:    levothyroxine 112 mcg tablet, Take 1 tablet (112 mcg total) by mouth daily in the early morning, Disp: 90 tablet, Rfl: 0    Secukinumab (COSENTYX SC), Inject under the skin, Disp: , Rfl:     simvastatin (ZOCOR) 40 mg tablet, Take 1 tablet (40 mg total) by mouth daily at bedtime, Disp: 90 tablet, Rfl: 3    topiramate (TOPAMAX) 25 mg tablet, Take 1 tablet (25 mg total) by mouth daily at bedtime (Patient not taking: Reported on 10/21/2020), Disp: 30 tablet, Rfl: 1  Food Intake and Lifestyle Assessment   Food Intake Assessment completed via usual diet recall  Wake up: 6amBed: 10pm    7am Breakfast: meal replacement shake with almond milk and fruit( Baritric Advantage ) or eggs ( on weekends)  This month has been going to the dinner on weekends (adding a pancake or bread or waffle)  10:30am:  Lunch : packed leftovers from night before: baked chicken drumstick with side salad (yesterday quinoa with chicken and avocado and chick peas) no change  2pm Snack: pre portioned nuts or 70 calorie biscuits, sometimes candy from vending machine  Tried to limit candy by choosing SF jello or cottage cheese  6pm Dinner: see lunch (does pick while cooking)   No change  Snack: 2 cookies brought by   Cut back here and trying to use SF ice pops  Beverage intake: water and sugar free beverages and one cup coffee (2% milk, 2 tsp sugar) per day  Protein supplement: Protein powder Bariatric Advantage  Estimated protein intake per day: 60-80g  Estimated fluid intake per day: 32-64oz, water, 8oz coffee, 8oz tea, 8oz juice cut back, 6oz wine eliminated wine  Meals eaten away from home:  Been going to dinners on the weekends  -stomach issues have improved  Typical meal pattern: 3 meals per day and 1-2 snacks per day  Eating Behaviors: Consumption of high calorie/ high fat foods and beverages  Food allergies or intolerances: No Known Allergies  Cultural or Presybeterian considerations:       Physical Assessment- no change  Physical Activity  Types of exercise: no walking due to severe arthritic pain  Gets very still by end of the day   Current physical limitations: knee pain    Psychosocial Assessment   Support systems: spouse and children  Spouse had bariatric surgery   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, 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     Nutrition Diagnosis- continued   Diagnosis: Overweight / Obesity (NC-3 3) and Excessive energy intake (NI-1 5)  Related to: Physical inactivity and Excessive energy intake  As Evidenced by: BMI >25 and Excessive energy intake     Nutrition Prescription: Recommend the following diet  Regular    Interventions and Teaching   Discussed pre-op and post-op nutrition guidelines  Patient educated and handouts provided    Surgical changes to stomach / GI  Capacity of post-surgery stomach  Diet progression  Adequate hydration  Sugar and fat restriction to decrease "dumping syndrome"  Expected weight loss  Exercise  Suggestions for pre-op diet  Nutrition considerations after surgery  Protein supplements  Meal planning and preparation  Appropriate carbohydrate, protein, and fat intake, and food/fluid choices to maximize safe weight loss, nutrient intake, and tolerance   Dietary and lifestyle changes  Possible problems with poor eating habits  Techniques for self monitoring and keeping daily food journal  Potential for food intolerance after surgery, and ways to deal with them including: lactose intolerance, nausea, reflux, vomiting, diarrhea, food intolerance, appetite changes, gas  Vitamin / Mineral supplementation of Multivitamin with minerals and Vitamin D  Patient has had tubal ligation     Education provided to: patient    Barriers to learning: No barriers identified    Readiness to change: preparation    Prior research on procedure: discussed with provider and pre-op class    Comprehension: verbalizes understanding     Expected Compliance: good  Workflow:   PCP Letter: done  Support Group: emailed info   6 Month Pre-Operative Program: done   Bloodwork: ordered    Cardiac Risk Assessment: done 5/26/2021   Sleep Studies: N/A   EGD done    Weight Loss: gained 10#   Psych/D+A/Nicotine? N/A     Pt is an appropriate candidate for surgery  Yes    Evaluation / Monitoring  Dietitian to Monitor: Eating pattern as discussed Tolerance of nutrition prescription Body weight Lab values Physical activity  Patient has gained 10 pounds since starting the program  Patient admits to not consistently following the meal plan  She has also not been food journaling  Discussed the importance of accountability and consistency with meal plan to evaluate effectiveness and recommend additional interventions or adjustments to meal plan  Patient agreeable to food journal  Recommend patient journal using [employer] nina for increased accountability  Set up telephone f/u to review journal in 1 week and weight check in 2 weeks     Meal Plan  2942-5546 calories daily, 60-75g protein  B  Protein shake mixed with 30 calorie almond milk - if fruit is added measure to 1/4 cup   L:  Dinner left overs - lean protein and vegetables or salad   S:  Pre portioned nuts or other snack - <200 calories  D:  Protein shake mixed with 30 calorie almond milk  Did inconsistently  S:  SF jello or SF popsicles    Goals  Food journal, Exercise 30 minutes 5 times per week, Complete lession plans 1-6, Eat 3 meals per day and Eliminate mindless snacking     Meal Plan  2758-9479 calories daily, 60-75g protein  B  Protein shake mixed with 30 calorie almond milk - if fruit is added measure to 1/4 cup   L:  Dinner left overs - lean protein and vegetables or salad   S:  Pre portioned nuts or other snack - <200 calories  D:  Protein shake mixed with 30 calorie almond milk    S:  SF jello or SF popsicles  Complete support group requirement   F/U in one month for pre op weight check       Time Spent:   30 minutes

## 2021-06-10 ENCOUNTER — OFFICE VISIT (OUTPATIENT)
Dept: BARIATRICS | Facility: CLINIC | Age: 46
End: 2021-06-10

## 2021-06-10 ENCOUNTER — TELEPHONE (OUTPATIENT)
Dept: BARIATRICS | Facility: CLINIC | Age: 46
End: 2021-06-10

## 2021-06-10 VITALS — HEIGHT: 61 IN | BODY MASS INDEX: 52.11 KG/M2

## 2021-06-10 DIAGNOSIS — E66.01 MORBID (SEVERE) OBESITY DUE TO EXCESS CALORIES (HCC): Primary | ICD-10-CM

## 2021-06-10 PROCEDURE — RECHECK: Performed by: DIETITIAN, REGISTERED

## 2021-06-10 NOTE — PROGRESS NOTES
Bariatric Nutrition Assessment Note  i  Name was verified by patient stating name? yes  ii   verified by patient stating? yes  iii  You verified the patient is alone? yes  iv  I would like to verify that you were offered a live visit but are now consenting to this telephone visit? yes  v  This visit is free yes    Type of surgery    Preop  Surgery Date: TBD- patient completed 6 month medical weight management program  Patient is here today for  Review of food journals and increase accountability-telephone visit    Surgeon: Dr Graeme Gross  39 y o   female     Wt with BMI of 25: 130 7lbs  Pre-Op Excess Wt: 132 3lbs  There were no vitals taken for this visit  - patient had not weighed herself today to provide a home weight  -based on previous weight: Weight stable x 1 month, 10 1# since starting the program     5% weight loss GW: 257 to schedule surgery    Linh Villalta Equation:   1791 kcal   Estimated calories for weight loss 6811-7570 kcal ( 1-2# per wk wt loss - sedentary )  Estimated protein needs 60-72 grams (1 0-1 2 gms/kg IBW )   Estimated fluid needs 2080 ml (35 ml/kg IBW )   70 ounces per day     Weight History   Onset of Obesity: Childhood  Family history of obesity: Yes  Wt Loss Attempts: Commercial Programs ([employer], Jenna Adhikari, etc )  FAD Diets (Cabbage soup, Grapefruit, Cleanse, etc )  High Protein/Low CHO diets (Atkins, Union, etc )  Meal Replacements (Roxine Carolina Fast, etc )  Nutrition Counseling with RD  OTC meds/supplements  Self Created Diets (Portion Control, Healthy Food Choices, etc )  Medical weight loss program   Maximum Wt Lost: 40lbs    Review of History and Medications   Past Medical History:   Diagnosis Date    Anxiety     Asthma     Disease of thyroid gland     hypo    Morbid obesity ([address] )     Psoriasis     Skin disorder     Vertigo      Past Surgical History:   Procedure Laterality Date     SECTION 2007 & 2010    2    KNEE SURGERY      ND EGD TRANSORAL BIOPSY SINGLE/MULTIPLE N/A 9/12/2018    Procedure: ESOPHAGOGASTRODUODENOSCOPY (EGD) with bx;  Surgeon: Elisabeth Resendiz MD;  Location: [facility];   Service: Bariatrics     Social History     Socioeconomic History    Marital status: /Civil Union     Spouse name: Not on file    Number of children: 2    Years of education: Not on file    Highest education level: Not on file   Occupational History    Occupation: Employed   Social Needs    Financial resource strain: Not on file    Food insecurity     Worry: Not on file     Inability: Not on file   [employer] needs     Medical: Not on file     Non-medical: Not on file   Tobacco Use    Smoking status: Never Smoker    Smokeless tobacco: Never Used   Substance and Sexual Activity    Alcohol use: Yes     Frequency: Monthly or less     Comment: occasional    Drug use: No    Sexual activity: Yes     Partners: Male     Birth control/protection: None   Lifestyle    Physical activity     Days per week: Not on file     Minutes per session: Not on file    Stress: Not on file   Relationships    Social connections     Talks on phone: Not on file     Gets together: Not on file     Attends Uatsdin service: Not on file     Active member of club or organization: Not on file     Attends meetings of clubs or organizations: Not on file     Relationship status: Not on file    Intimate partner violence     Fear of current or ex partner: Not on file     Emotionally abused: Not on file     Physically abused: Not on file     Forced sexual activity: Not on file   Other Topics Concern    Not on file   Social History Narrative    Always uses seat belt    Health: [place] ([address])    Lives in private house    Lives with children    Lives with spouse    No advance directives    No caffeine use    No Living will    Supportive and safe               Current Outpatient Medications:     clobetasol (TEMOVATE) 0 05 % cream, APPLY TOPICALLY 2 (TWO) TIMES A DAY, Disp: 60 g, Rfl: 3    cyclobenzaprine (FLEXERIL) 10 mg tablet, TAKE 1 TABLET (10 MG TOTAL) BY MOUTH DAILY AT BEDTIME AS NEEDED FOR MUSCLE SPASMS, Disp: 30 tablet, Rfl: 3    dimenhyDRINATE (DRAMAMINE) 50 mg tablet, Take 50 mg by mouth 2 (two) times a day as needed for movement disorders, Disp: , Rfl:     levothyroxine 112 mcg tablet, Take 1 tablet (112 mcg total) by mouth daily in the early morning, Disp: 90 tablet, Rfl: 0    Secukinumab (COSENTYX SC), Inject under the skin, Disp: , Rfl:     simvastatin (ZOCOR) 40 mg tablet, Take 1 tablet (40 mg total) by mouth daily at bedtime, Disp: 90 tablet, Rfl: 3    topiramate (TOPAMAX) 25 mg tablet, Take 1 tablet (25 mg total) by mouth daily at bedtime (Patient not taking: Reported on 10/21/2020), Disp: 30 tablet, Rfl: 1  Food Intake and Lifestyle Assessment   Baritastic reviewed- incomplete food journals, based on what is journaled (more consistently breakfast and lunch and snacks, no dinners)608 calories, 50 grams protein- discussed with patient to focus on journaling dinners over the next week prior to next weight check  Patient has started leaving sticky notes around her kitchen to remind her to use protein shake for dinner, rec add one for food journaling at dinner      Food Intake Assessment completed via usual diet recall  Wake up: 6amBed: 10pm    7am Breakfast: meal replacement shake with almond milk and fruit( Baritric Advantage ) or eggs ( on weekends)  This month has been going to the dinner on weekends (adding a pancake or bread or waffle)  10:30am:  Lunch : packed leftovers from night before: baked chicken drumstick with side salad (yesterday quinoa with chicken and avocado and chick peas) no change  2pm Snack: pre portioned nuts or 70 calorie biscuits, sometimes candy from vending machine  Tried to limit candy by choosing SF jello or cottage cheese  6pm Dinner: see lunch (does pick while cooking) No change  Snack: 2 cookies brought by   Cut back here and trying to use SF ice pops  Beverage intake: water and sugar free beverages and one cup coffee (2% milk, 2 tsp sugar) per day  Protein supplement: Protein powder Bariatric Advantage  Estimated protein intake per day: 60-80g  Estimated fluid intake per day: 32-64oz, water, 8oz coffee, 8oz tea, 8oz juice cut back, 6oz wine eliminated wine  Meals eaten away from home:  Been going to dinners on the weekends  -stomach issues have improved  Typical meal pattern: 3 meals per day and 1-2 snacks per day  Eating Behaviors: Consumption of high calorie/ high fat foods and beverages  Food allergies or intolerances: No Known Allergies  Cultural or Gnosticism considerations:   Physical Assessment- no change  Physical Activity  Types of exercise: no walking due to severe arthritic pain  Gets very still by end of the day   Current physical limitations: knee pain    Psychosocial Assessment   Support systems: spouse and children  Spouse had bariatric surgery   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, 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     Nutrition Diagnosis- continued   Diagnosis: Overweight / Obesity (NC-3 3) and Excessive energy intake (NI-1 5)  Related to: Physical inactivity and Excessive energy intake  As Evidenced by: BMI >25 and Excessive energy intake     Nutrition Prescription: Recommend the following diet  Regular    Interventions and Teaching   Discussed pre-op and post-op nutrition guidelines  Patient educated and handouts provided    Surgical changes to stomach / GI  Capacity of post-surgery stomach  Diet progression  Adequate hydration  Sugar and fat restriction to decrease "dumping syndrome"  Expected weight loss  Exercise  Suggestions for pre-op diet  Nutrition considerations after surgery  Protein supplements  Meal planning and preparation  Appropriate carbohydrate, protein, and fat intake, and food/fluid choices to maximize safe weight loss, nutrient intake, and tolerance   Dietary and lifestyle changes  Possible problems with poor eating habits  Techniques for self monitoring and keeping daily food journal  Potential for food intolerance after surgery, and ways to deal with them including: lactose intolerance, nausea, reflux, vomiting, diarrhea, food intolerance, appetite changes, gas  Vitamin / Mineral supplementation of Multivitamin with minerals and Vitamin D  Patient has had tubal ligation     Education provided to: patient    Barriers to learning: No barriers identified    Readiness to change: preparation    Prior research on procedure: discussed with provider and pre-op class    Comprehension: verbalizes understanding     Expected Compliance: good  Workflow:   PCP Letter: siena Cornell Support Group: emailed info   6 Month Pre-Operative Program: done   Bloodwork: ordered    Cardiac Risk Assessment: done 5/26/2021   Sleep Studies: N/A   EGD done    Weight Loss: gained 10#   Psych/D+A/Nicotine? N/A     Pt is an appropriate candidate for surgery  Yes    Evaluation / Monitoring  Dietitian to Monitor: Eating pattern as discussed Tolerance of nutrition prescription Body weight Lab values Physical activity  Continue with meal plan  Stressed efforts with dinner for increased adherence to meal plan  Continue to encourage food journaling      Meal Plan  1865-3190 calories daily, 60-75g protein  B  Protein shake mixed with 30 calorie almond milk - if fruit is added measure to 1/4 cup   L:  Dinner left overs - lean protein and vegetables or salad   S:  Pre portioned nuts or other snack - <200 calories  D:  Protein shake mixed with 30 calorie almond milk  Did inconsistently  S:  SF jello or SF popsicles    Goals  Food journal, Exercise 30 minutes 5 times per week, Complete lession plans 1-6, Eat 3 meals per day and Eliminate mindless snacking     Meal Plan  6770-2545 calories daily, 60-75g protein  B  Protein shake mixed with 30 calorie almond milk - if fruit is added measure to 1/4 cup   L:  Dinner left overs - lean protein and vegetables or salad   S:  Pre portioned nuts or other snack - <200 calories  D:  Protein shake mixed with 30 calorie almond milk    S:  SF jello or SF popsicles  Complete support group requirement   F/U in one month for pre op weight check       Time Spent:   30 minutes

## 2021-06-29 ENCOUNTER — APPOINTMENT (OUTPATIENT)
Dept: LAB | Facility: CLINIC | Age: 46
End: 2021-06-29
Payer: COMMERCIAL

## 2021-06-29 DIAGNOSIS — L40.9 PSORIASIS: ICD-10-CM

## 2021-06-29 DIAGNOSIS — E03.9 ACQUIRED HYPOTHYROIDISM: ICD-10-CM

## 2021-06-29 DIAGNOSIS — Z01.818 PREOPERATIVE TESTING: ICD-10-CM

## 2021-06-29 DIAGNOSIS — E66.01 MORBID (SEVERE) OBESITY DUE TO EXCESS CALORIES (HCC): ICD-10-CM

## 2021-06-29 DIAGNOSIS — E78.2 MIXED HYPERLIPIDEMIA: ICD-10-CM

## 2021-06-29 LAB
ALBUMIN SERPL BCP-MCNC: 3 G/DL (ref 3.5–5)
ALP SERPL-CCNC: 110 U/L (ref 46–116)
ALT SERPL W P-5'-P-CCNC: 16 U/L (ref 12–78)
ANION GAP SERPL CALCULATED.3IONS-SCNC: 3 MMOL/L (ref 4–13)
AST SERPL W P-5'-P-CCNC: 10 U/L (ref 5–45)
BILIRUB SERPL-MCNC: 0.36 MG/DL (ref 0.2–1)
BUN SERPL-MCNC: 10 MG/DL (ref 5–25)
CALCIUM ALBUM COR SERPL-MCNC: 9.4 MG/DL (ref 8.3–10.1)
CALCIUM SERPL-MCNC: 8.6 MG/DL (ref 8.3–10.1)
CHLORIDE SERPL-SCNC: 106 MMOL/L (ref 100–108)
CO2 SERPL-SCNC: 29 MMOL/L (ref 21–32)
CREAT SERPL-MCNC: 0.58 MG/DL (ref 0.6–1.3)
ERYTHROCYTE [DISTWIDTH] IN BLOOD BY AUTOMATED COUNT: 13.5 % (ref 11.6–15.1)
GFR SERPL CREATININE-BSD FRML MDRD: 111 ML/MIN/1.73SQ M
GLUCOSE P FAST SERPL-MCNC: 113 MG/DL (ref 65–99)
HCT VFR BLD AUTO: 38 % (ref 34.8–46.1)
HGB BLD-MCNC: 12 G/DL (ref 11.5–15.4)
MCH RBC QN AUTO: 28.3 PG (ref 26.8–34.3)
MCHC RBC AUTO-ENTMCNC: 31.6 G/DL (ref 31.4–37.4)
MCV RBC AUTO: 90 FL (ref 82–98)
PLATELET # BLD AUTO: 492 THOUSANDS/UL (ref 149–390)
PMV BLD AUTO: 10.6 FL (ref 8.9–12.7)
POTASSIUM SERPL-SCNC: 3.7 MMOL/L (ref 3.5–5.3)
PROT SERPL-MCNC: 7.3 G/DL (ref 6.4–8.2)
RBC # BLD AUTO: 4.24 MILLION/UL (ref 3.81–5.12)
SODIUM SERPL-SCNC: 138 MMOL/L (ref 136–145)
T4 FREE SERPL-MCNC: 1.04 NG/DL (ref 0.76–1.46)
TSH SERPL DL<=0.05 MIU/L-ACNC: 5.1 UIU/ML (ref 0.36–3.74)
WBC # BLD AUTO: 17.06 THOUSAND/UL (ref 4.31–10.16)

## 2021-06-29 PROCEDURE — 84439 ASSAY OF FREE THYROXINE: CPT

## 2021-06-29 PROCEDURE — 80053 COMPREHEN METABOLIC PANEL: CPT

## 2021-06-29 PROCEDURE — 85027 COMPLETE CBC AUTOMATED: CPT

## 2021-06-29 PROCEDURE — 36415 COLL VENOUS BLD VENIPUNCTURE: CPT

## 2021-06-29 PROCEDURE — 84443 ASSAY THYROID STIM HORMONE: CPT

## 2021-06-30 ENCOUNTER — APPOINTMENT (OUTPATIENT)
Dept: LAB | Facility: CLINIC | Age: 46
End: 2021-06-30
Payer: COMMERCIAL

## 2021-06-30 DIAGNOSIS — Z79.899 ENCOUNTER FOR LONG-TERM (CURRENT) USE OF OTHER MEDICATIONS: ICD-10-CM

## 2021-06-30 LAB
ALBUMIN SERPL BCP-MCNC: 2.9 G/DL (ref 3.5–5)
ALP SERPL-CCNC: 110 U/L (ref 46–116)
ALT SERPL W P-5'-P-CCNC: 13 U/L (ref 12–78)
ANION GAP SERPL CALCULATED.3IONS-SCNC: 5 MMOL/L (ref 4–13)
AST SERPL W P-5'-P-CCNC: 9 U/L (ref 5–45)
BASOPHILS # BLD AUTO: 0.07 THOUSANDS/ΜL (ref 0–0.1)
BASOPHILS NFR BLD AUTO: 0 % (ref 0–1)
BILIRUB SERPL-MCNC: 0.47 MG/DL (ref 0.2–1)
BUN SERPL-MCNC: 10 MG/DL (ref 5–25)
CALCIUM ALBUM COR SERPL-MCNC: 9.9 MG/DL (ref 8.3–10.1)
CALCIUM SERPL-MCNC: 9 MG/DL (ref 8.3–10.1)
CHLORIDE SERPL-SCNC: 105 MMOL/L (ref 100–108)
CO2 SERPL-SCNC: 26 MMOL/L (ref 21–32)
CREAT SERPL-MCNC: 0.58 MG/DL (ref 0.6–1.3)
EOSINOPHIL # BLD AUTO: 0.35 THOUSAND/ΜL (ref 0–0.61)
EOSINOPHIL NFR BLD AUTO: 2 % (ref 0–6)
ERYTHROCYTE [DISTWIDTH] IN BLOOD BY AUTOMATED COUNT: 13.6 % (ref 11.6–15.1)
GFR SERPL CREATININE-BSD FRML MDRD: 111 ML/MIN/1.73SQ M
GLUCOSE P FAST SERPL-MCNC: 111 MG/DL (ref 65–99)
HCT VFR BLD AUTO: 37.8 % (ref 34.8–46.1)
HGB BLD-MCNC: 11.9 G/DL (ref 11.5–15.4)
IMM GRANULOCYTES # BLD AUTO: 0.16 THOUSAND/UL (ref 0–0.2)
IMM GRANULOCYTES NFR BLD AUTO: 1 % (ref 0–2)
LYMPHOCYTES # BLD AUTO: 4.6 THOUSANDS/ΜL (ref 0.6–4.47)
LYMPHOCYTES NFR BLD AUTO: 26 % (ref 14–44)
MCH RBC QN AUTO: 28.3 PG (ref 26.8–34.3)
MCHC RBC AUTO-ENTMCNC: 31.5 G/DL (ref 31.4–37.4)
MCV RBC AUTO: 90 FL (ref 82–98)
MONOCYTES # BLD AUTO: 1.07 THOUSAND/ΜL (ref 0.17–1.22)
MONOCYTES NFR BLD AUTO: 6 % (ref 4–12)
NEUTROPHILS # BLD AUTO: 11.24 THOUSANDS/ΜL (ref 1.85–7.62)
NEUTS SEG NFR BLD AUTO: 65 % (ref 43–75)
NRBC BLD AUTO-RTO: 0 /100 WBCS
PLATELET # BLD AUTO: 474 THOUSANDS/UL (ref 149–390)
PMV BLD AUTO: 10.2 FL (ref 8.9–12.7)
POTASSIUM SERPL-SCNC: 3.6 MMOL/L (ref 3.5–5.3)
PROT SERPL-MCNC: 7.5 G/DL (ref 6.4–8.2)
RBC # BLD AUTO: 4.21 MILLION/UL (ref 3.81–5.12)
SODIUM SERPL-SCNC: 136 MMOL/L (ref 136–145)
WBC # BLD AUTO: 17.49 THOUSAND/UL (ref 4.31–10.16)

## 2021-06-30 PROCEDURE — 86480 TB TEST CELL IMMUN MEASURE: CPT

## 2021-06-30 PROCEDURE — 36415 COLL VENOUS BLD VENIPUNCTURE: CPT

## 2021-06-30 PROCEDURE — 85025 COMPLETE CBC W/AUTO DIFF WBC: CPT

## 2021-06-30 PROCEDURE — 80053 COMPREHEN METABOLIC PANEL: CPT

## 2021-07-02 LAB
GAMMA INTERFERON BACKGROUND BLD IA-ACNC: 0.03 IU/ML
M TB IFN-G BLD-IMP: NEGATIVE
M TB IFN-G CD4+ BCKGRND COR BLD-ACNC: -0.01 IU/ML
M TB IFN-G CD4+ BCKGRND COR BLD-ACNC: 0 IU/ML
MITOGEN IGNF BCKGRD COR BLD-ACNC: >10 IU/ML

## 2021-07-19 ENCOUNTER — TELEPHONE (OUTPATIENT)
Dept: BARIATRICS | Facility: CLINIC | Age: 46
End: 2021-07-19

## 2021-07-19 NOTE — TELEPHONE ENCOUNTER
Called patient and left message on VM to reschedule RD pre op weight check Also mailed out unable to reach letter

## 2021-07-29 NOTE — PROGRESS NOTES
Bariatric Follow Up Nutrition Note    Preop 4 months  Preop Program  4 of 4 completed 3/24/2021    Type of surgery  Preop  Surgery Date: TBD  Surgeon: Dr Shante Gama  55 y o   female  Wt 127 kg (279 lb 1 6 oz)   BMI 53 26 kg/m²    6# wt gain from last office visit 2 months ago  Net 9# wt gain from initial evaluation 2020  Pt now needs at least 9# wt loss to schedule surgery and 22  6# wt loss by surgery  [facility] Equation:   1791 kcal   Estimated calories for weight loss 8443-1334 kcal ( 1-2# per wk wt loss - sedentary )  Estimated protein needs 60-72 grams (1 0-1 2 gms/kg IBW )   Estimated fluid needs 2080 ml (35 ml/kg IBW )   70 ounces per day     Weight on Day of Weight Loss Surgery: Day of surgery goal weight= 5% wt loss = 256 5#  Wt with BMI of 25: 130 7lbs  Pre-Op Excess Wt: 132 3lbs    Review of History and Medications   Past Medical History:   Diagnosis Date    Anxiety     Asthma     Disease of thyroid gland     hypo    Morbid obesity ([address] )     Psoriasis     Skin disorder     Vertigo      Past Surgical History:   Procedure Laterality Date     SECTION   &     2    KNEE SURGERY      LA EGD TRANSORAL BIOPSY SINGLE/MULTIPLE N/A 2018    Procedure: ESOPHAGOGASTRODUODENOSCOPY (EGD) with bx;  Surgeon: Jason Holt MD;  Location: [facility];   Service: Bariatrics     Social History     Socioeconomic History    Marital status: /Civil Union     Spouse name: Not on file    Number of children: 2    Years of education: Not on file    Highest education level: Not on file   Occupational History    Occupation: Employed   Tobacco Use    Smoking status: Never Smoker    Smokeless tobacco: Never Used   Vaping Use    Vaping Use: Never used   Substance and Sexual Activity    Alcohol use: Yes     Comment: occasional    Drug use: No    Sexual activity: Yes     Partners: Male     Birth control/protection: None Other Topics Concern    Not on file   Social History Narrative    Always uses seat belt    [employer] ([address])    Lives in private house    Lives with children    Lives with spouse    No advance directives    No caffeine use    No Living will    Supportive and safe             Social Determinants of Health     Financial Resource Strain:     Difficulty of Paying Living Expenses:    Food Insecurity:     Worried About Running Out of Food in the Last Year:     Ran Out of Food in the Last Year:    Transportation Needs:     Lack of Transportation (Medical):      Lack of Transportation (Non-Medical):    Physical Activity:     Days of Exercise per Week:     Minutes of Exercise per Session:    Stress:     Feeling of Stress :    Social Connections:     Frequency of Communication with Friends and Family:     Frequency of Social Gatherings with Friends and Family:     Attends Christian Services:     Active Member of Clubs or Organizations:     Attends Club or Organization Meetings:     Marital Status:    Intimate Partner Violence:     Fear of Current or Ex-Partner:     Emotionally Abused:     Physically Abused:     Sexually Abused:        Current Outpatient Medications:     clobetasol (TEMOVATE) 0 05 % cream, APPLY TOPICALLY 2 (TWO) TIMES A DAY, Disp: 60 g, Rfl: 3    cyclobenzaprine (FLEXERIL) 10 mg tablet, TAKE 1 TABLET (10 MG TOTAL) BY MOUTH DAILY AT BEDTIME AS NEEDED FOR MUSCLE SPASMS, Disp: 30 tablet, Rfl: 3    dimenhyDRINATE (DRAMAMINE) 50 mg tablet, Take 50 mg by mouth 2 (two) times a day as needed for movement disorders, Disp: , Rfl:     levothyroxine 125 mcg tablet, Take 1 tablet (125 mcg total) by mouth daily, Disp: 30 tablet, Rfl: 1    Secukinumab (COSENTYX SC), Inject under the skin, Disp: , Rfl:     simvastatin (ZOCOR) 40 mg tablet, Take 1 tablet (40 mg total) by mouth daily at bedtime, Disp: 90 tablet, Rfl: 3    topiramate (TOPAMAX) 25 mg tablet, Take 1 tablet (25 mg total) by mouth daily at bedtime (Patient not taking: Reported on 10/21/2020), Disp: 30 tablet, Rfl: 1    Food Intake and Lifestyle Assessment   Food Intake Assessment completed via 24 hour recall  Pt reports she has been journaling on paper but did not bring them with her  Pt last used [employer] nina on 6/10/2021  Breakfast: Protein Shake: Premier Protein  Lunch: ordered salad with balsamic dressing and 1/2 cup of tuna salad  Snacks:  Sliced apples or 45-QUXIONF dried fruit and nut pack or 70 calorie brownie  Dinner : salad with grilled chicken  Beverage intake: 64 oz water, 1 cup coffee with 1%-2% milk and 1 5 tbsp brown sugar, occasional Lisa water  Protein supplement: Premier Protein  Estimated protein intake per day: 60-80g  Estimated fluid intake per day: 64 oz water, 8-16 oz coffee  Meals eaten away from home:  Pt has cut down going to diners, states less than once a week now and orders egg white with wheat bread   -stomach issues have improved  Typical meal pattern: 3 meals per day and 1-2 snacks per day  Eating Behaviors: Consumption of high calorie/ high fat foods and beverages  Food allergies or intolerances: No Known Allergies  Cultural or Buddhist considerations:       Physical Assessment- no change  Physical Activity  Types of exercise: no walking due to severe arthritic pain  Gets very still by end of the day   Current physical limitations: knee pain     Psychosocial Assessment   Support systems: spouse and children  Spouse had bariatric surgery   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, 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      Nutrition Diagnosis- continued   Diagnosis: Overweight / Obesity (NC-3 3) and Excessive energy intake (NI-1 5)  Related to: Physical inactivity and Excessive energy intake  As Evidenced by: BMI >25 and Excessive energy intake     Interventions and Teaching   Patient educated on post-op nutrition guidelines         Patient educated and handouts provided  Provided complete printed instructions for pre-op liquid deit and instructed pt on partial liquid diet, nonstarchy vegetables, lean protein portions, and calorie-free beverages  Adequate hydration  Expected weight loss  Suggestions for pre-op diet  Protein supplements  Appropriate carbohydrate, protein, and fat intake, and food/fluid choices to maximize safe weight loss, nutrient intake, and tolerance   Techniques for self monitoring and keeping daily food journal    Education provided to: patient  Barriers to learning: No barriers identified  Readiness to change: action  Comprehension: needs reinforcement   Expected Compliance: fair    Evaluation/Monitoring   Eating pattern as discussed Tolerance of nutrition prescription Body weight Lab values Physical activity Bowel pattern    Goals  Eliminate sugar sweetened beverages, Food journal, Exercise 30 minutes 5 times per week, Complete lession plans 1-6, Eat 3 meals per day and Eliminate mindless snacking   1) Begin partial liquid diet:  Two protein drink meal replacements, one meal of 6 oz LEAN protein and 2 cups NONSTARCHY vegetables  2) Complete support group  Workflow:   Bloodwork: Lipids & TSH 2/19/21  TSH rechecked 6/29/21= 5 100(High)  CBC and CMP done 6/30/21:  Elevated WBCs and Platelets  Pt reports PCP is aware and WBC become elevated during Psoriasis flare-ups   Support Group: not done   Weight Loss: 5% wt loss = 257#  Pt is up 9lbs from evaluation weight in November  Needs at least 9# wt loss to schedule surgery      Time Spent:   30 Minutes

## 2021-07-30 ENCOUNTER — OFFICE VISIT (OUTPATIENT)
Dept: BARIATRICS | Facility: CLINIC | Age: 46
End: 2021-07-30

## 2021-07-30 VITALS — WEIGHT: 279.1 LBS | BODY MASS INDEX: 53.26 KG/M2

## 2021-07-30 DIAGNOSIS — E66.01 MORBID (SEVERE) OBESITY DUE TO EXCESS CALORIES (HCC): Primary | ICD-10-CM

## 2021-07-30 DIAGNOSIS — E66.01 MORBID OBESITY (HCC): ICD-10-CM

## 2021-07-30 PROCEDURE — RECHECK: Performed by: DIETITIAN, REGISTERED

## 2021-08-26 ENCOUNTER — APPOINTMENT (OUTPATIENT)
Dept: LAB | Facility: CLINIC | Age: 46
End: 2021-08-26
Payer: COMMERCIAL

## 2021-08-26 DIAGNOSIS — E03.9 ACQUIRED HYPOTHYROIDISM: ICD-10-CM

## 2021-08-26 LAB — TSH SERPL DL<=0.05 MIU/L-ACNC: 2.33 UIU/ML (ref 0.36–3.74)

## 2021-08-26 PROCEDURE — 84443 ASSAY THYROID STIM HORMONE: CPT

## 2021-08-26 PROCEDURE — 36415 COLL VENOUS BLD VENIPUNCTURE: CPT

## 2021-08-30 ENCOUNTER — OFFICE VISIT (OUTPATIENT)
Dept: FAMILY MEDICINE CLINIC | Facility: CLINIC | Age: 46
End: 2021-08-30
Payer: COMMERCIAL

## 2021-08-30 VITALS
HEIGHT: 60 IN | RESPIRATION RATE: 18 BRPM | HEART RATE: 98 BPM | DIASTOLIC BLOOD PRESSURE: 76 MMHG | BODY MASS INDEX: 54.81 KG/M2 | WEIGHT: 279.2 LBS | TEMPERATURE: 97.2 F | SYSTOLIC BLOOD PRESSURE: 122 MMHG

## 2021-08-30 DIAGNOSIS — Z12.31 ENCOUNTER FOR SCREENING MAMMOGRAM FOR MALIGNANT NEOPLASM OF BREAST: ICD-10-CM

## 2021-08-30 DIAGNOSIS — E66.01 MORBID (SEVERE) OBESITY DUE TO EXCESS CALORIES (HCC): ICD-10-CM

## 2021-08-30 DIAGNOSIS — E04.1 THYROID NODULE: ICD-10-CM

## 2021-08-30 DIAGNOSIS — E03.9 ACQUIRED HYPOTHYROIDISM: Primary | ICD-10-CM

## 2021-08-30 PROCEDURE — 99214 OFFICE O/P EST MOD 30 MIN: CPT | Performed by: NURSE PRACTITIONER

## 2021-08-30 RX ORDER — LEVOTHYROXINE SODIUM 0.1 MG/1
100 TABLET ORAL DAILY
Qty: 90 TABLET | Refills: 1 | Status: SHIPPED | OUTPATIENT
Start: 2021-08-30 | End: 2022-04-07

## 2021-08-30 NOTE — ASSESSMENT & PLAN NOTE
Patient does have known tiny nodule that did not meet criteria for biospy  Will repeat US now that patient is having pain

## 2021-08-30 NOTE — ASSESSMENT & PLAN NOTE
Patient previous on topamax  She has been going to weight management to try to loose 10-15 pounds prior to being approved for bariatric surgery  She does carb reduction and calorie reduction  Great water intake  Exercise limited by physical capabilities  We will try saskiaenda  Patient advised it might not auth but we will try

## 2021-08-30 NOTE — PROGRESS NOTES
Assessment and Plan:    Problem List Items Addressed This Visit        Endocrine    Acquired hypothyroidism - Primary     Patient tsh is doing well on current consistent dose of levothyroxine 100mcg  Will refill this and repeat tsh in 3 months  Relevant Medications    levothyroxine 100 mcg tablet    Other Relevant Orders    US thyroid    TSH, 3rd generation with Free T4 reflex    Thyroid nodule     Patient does have known tiny nodule that did not meet criteria for biospy  Will repeat US now that patient is having pain  Relevant Medications    levothyroxine 100 mcg tablet    Other Relevant Orders    US thyroid       Other    Morbid (severe) obesity due to excess calories ([facility] [address] )     Patient previous on topamax  She has been going to weight management to try to loose 10-15 pounds prior to being approved for bariatric surgery  She does carb reduction and calorie reduction  Great water intake  Exercise limited by physical capabilities  We will try saxenda  Patient advised it might not auth but we will try  Relevant Medications    liraglutide (SAXENDA) injection      Other Visit Diagnoses     Encounter for screening mammogram for malignant neoplasm of breast        Relevant Orders    Mammo screening bilateral w 3d & cad                 Diagnoses and all orders for this visit:    Acquired hypothyroidism  -     levothyroxine 100 mcg tablet; Take 1 tablet (100 mcg total) by mouth daily  -     US thyroid; Future  -     TSH, 3rd generation with Free T4 reflex; Future    Thyroid nodule  -     US thyroid; Future    Encounter for screening mammogram for malignant neoplasm of breast  -     Mammo screening bilateral w 3d & cad; Future    Morbid (severe) obesity due to excess calories (HCC)  -     liraglutide (SAXENDA) injection;  Inject 0 1 mL (0 6 mg total) under the skin daily for 7 days, THEN 0 2 mL (1 2 mg total) daily for 7 days, THEN 0 3 mL (1 8 mg total) daily for 7 days, THEN 0 4 mL (2 4 mg total) daily for 7 days, THEN 0 5 mL (3 mg total) daily  Subjective:      Patient ID: Chelsie Mcfarlane is a 55 y o  female  CC:    Chief Complaint   Patient presents with    Follow-up     Patient here for follow up for thyroid issues  Pt will like to discuss lump on left side of neck onset couple of weeks ago  Pt states feeling discomfort, pain and throat feels hoarse       HPI:    Patient presents for throat/thyroid pain  Also follow up on her thyroid medication  She feels a lump in her throat which she started the last few weeks  The last two weeks it has been persistent  She reports she feels the lump each time she looks up and down, laying down  She lynne snot feel this when she is swallowing  Patient is currently taking 100mcg  Patient was prescribed topamax for weight loss but did not tolerate the medication  She has been trying to reduce her calories to under 200 per day  She has reduced her carbs  She is drinking 64 ounces of water  Finds it difficult to exercise due to psoriatic arthritis in her knees  The following portions of the patient's history were reviewed and updated as appropriate: allergies, current medications, past family history, past medical history, past social history, past surgical history and problem list       Review of Systems   Constitutional: Negative for chills and fever  HENT: Negative for ear pain, sore throat and trouble swallowing  Throat pain    Eyes: Negative for pain and visual disturbance  Respiratory: Negative for cough and shortness of breath  Cardiovascular: Negative for chest pain and palpitations  Gastrointestinal: Negative for abdominal pain and vomiting  Genitourinary: Negative for dysuria and hematuria  Musculoskeletal: Positive for neck pain  Negative for arthralgias, back pain and neck stiffness  Skin: Negative for color change and rash  Neurological: Negative for dizziness, seizures, syncope and headaches     All other systems reviewed and are negative  Data to review:       Objective:    Vitals:    08/30/21 1557   BP: 122/76   BP Location: Right arm   Patient Position: Sitting   Cuff Size: Adult   Pulse: 98   Resp: 18   Temp: (!) 97 2 °F (36 2 °C)   TempSrc: Temporal   Weight: 127 kg (279 lb 3 2 oz)   Height: 5' (1 524 m)        Physical Exam  Vitals and nursing note reviewed  Constitutional:       General: She is not in acute distress  Appearance: Normal appearance  She is obese  She is not ill-appearing, toxic-appearing or diaphoretic  HENT:      Head: Normocephalic and atraumatic  Right Ear: External ear normal       Left Ear: External ear normal    Eyes:      Extraocular Movements: Extraocular movements intact  Conjunctiva/sclera: Conjunctivae normal    Neck:      Thyroid: Thyromegaly and thyroid tenderness (left side ) present  Trachea: Trachea normal    Cardiovascular:      Rate and Rhythm: Normal rate and regular rhythm  Heart sounds: Normal heart sounds, S1 normal and S2 normal  No murmur heard  Pulmonary:      Effort: Pulmonary effort is normal       Breath sounds: Normal breath sounds  Musculoskeletal:      Cervical back: Normal range of motion  Lymphadenopathy:      Cervical: No cervical adenopathy  Skin:     Coloration: Skin is not pale  Neurological:      Mental Status: She is alert and oriented to person, place, and time  Psychiatric:         Mood and Affect: Mood normal          Behavior: Behavior normal          Thought Content:  Thought content normal          Judgment: Judgment normal

## 2021-08-30 NOTE — ASSESSMENT & PLAN NOTE
Patient tsh is doing well on current consistent dose of levothyroxine 100mcg  Will refill this and repeat tsh in 3 months

## 2021-09-04 ENCOUNTER — HOSPITAL ENCOUNTER (OUTPATIENT)
Dept: ULTRASOUND IMAGING | Facility: HOSPITAL | Age: 46
Discharge: HOME | End: 2021-09-04
Payer: COMMERCIAL

## 2021-09-04 DIAGNOSIS — E04.1 THYROID NODULE: ICD-10-CM

## 2021-09-04 DIAGNOSIS — E03.9 ACQUIRED HYPOTHYROIDISM: ICD-10-CM

## 2021-09-04 PROCEDURE — 76536 US EXAM OF HEAD AND NECK: CPT

## 2021-09-17 ENCOUNTER — TELEPHONE (OUTPATIENT)
Dept: FAMILY MEDICINE CLINIC | Facility: CLINIC | Age: 46
End: 2021-09-17

## 2021-09-17 NOTE — TELEPHONE ENCOUNTER
----- Message from Mame Mckeon sent at 9/13/2021  2:51 PM EDT -----  Thyroid US did not show any change in size of the nodules and no new nodules are seen  Would recommend ENT eval as next step   Referral entered

## 2021-09-21 ENCOUNTER — TELEPHONE (OUTPATIENT)
Dept: BARIATRICS | Facility: CLINIC | Age: 46
End: 2021-09-21

## 2021-10-05 ENCOUNTER — TELEPHONE (OUTPATIENT)
Dept: BARIATRICS | Facility: CLINIC | Age: 46
End: 2021-10-05

## 2021-11-22 ENCOUNTER — TELEPHONE (OUTPATIENT)
Dept: BARIATRICS | Facility: CLINIC | Age: 46
End: 2021-11-22

## 2022-04-07 DIAGNOSIS — E03.9 ACQUIRED HYPOTHYROIDISM: ICD-10-CM

## 2022-04-07 RX ORDER — LEVOTHYROXINE SODIUM 0.1 MG/1
TABLET ORAL
Qty: 90 TABLET | Refills: 1 | Status: SHIPPED | OUTPATIENT
Start: 2022-04-07

## 2022-04-07 NOTE — TELEPHONE ENCOUNTER
Requested Prescriptions     Pending Prescriptions Disp Refills    levothyroxine 100 mcg tablet [Pharmacy Med Name: LEVOTHYROXINE 100 MCG TABLET] 90 tablet 1     Sig: TAKE 1 TABLET BY MOUTH EVERY DAY     LOV 8/30/21, F/U non schedu;ed, Labs pending

## 2023-04-27 ENCOUNTER — RA CDI HCC (OUTPATIENT)
Dept: OTHER | Facility: HOSPITAL | Age: 48
End: 2023-04-27

## 2023-04-27 NOTE — PROGRESS NOTES
[facility]  coding opportunities       Chart reviewed, no opportunity found: CHART REVIEWED, NO OPPORTUNITY FOUND        Patients Insurance        Commercial Insurance: [address] [place] [address]

## 2023-05-04 ENCOUNTER — TELEPHONE (OUTPATIENT)
Dept: ADMINISTRATIVE | Facility: OTHER | Age: 48
End: 2023-05-04

## 2023-05-04 ENCOUNTER — APPOINTMENT (OUTPATIENT)
Dept: LAB | Facility: CLINIC | Age: 48
End: 2023-05-04

## 2023-05-04 ENCOUNTER — CONSULT (OUTPATIENT)
Dept: FAMILY MEDICINE CLINIC | Facility: CLINIC | Age: 48
End: 2023-05-04

## 2023-05-04 VITALS
BODY MASS INDEX: 54 KG/M2 | HEIGHT: 61 IN | HEART RATE: 84 BPM | DIASTOLIC BLOOD PRESSURE: 74 MMHG | SYSTOLIC BLOOD PRESSURE: 128 MMHG | OXYGEN SATURATION: 96 % | WEIGHT: 286 LBS

## 2023-05-04 DIAGNOSIS — E04.1 THYROID NODULE: ICD-10-CM

## 2023-05-04 DIAGNOSIS — E55.9 VITAMIN D DEFICIENCY: ICD-10-CM

## 2023-05-04 DIAGNOSIS — Z01.818 PRE-OPERATIVE CLEARANCE: ICD-10-CM

## 2023-05-04 DIAGNOSIS — D72.829 LEUKOCYTOSIS, UNSPECIFIED TYPE: ICD-10-CM

## 2023-05-04 DIAGNOSIS — L40.9 PSORIASIS: ICD-10-CM

## 2023-05-04 DIAGNOSIS — Z01.818 PRE-OPERATIVE CLEARANCE: Primary | ICD-10-CM

## 2023-05-04 DIAGNOSIS — Z12.31 ENCOUNTER FOR SCREENING MAMMOGRAM FOR MALIGNANT NEOPLASM OF BREAST: ICD-10-CM

## 2023-05-04 DIAGNOSIS — E03.9 ACQUIRED HYPOTHYROIDISM: ICD-10-CM

## 2023-05-04 DIAGNOSIS — Z12.11 SCREENING FOR COLON CANCER: ICD-10-CM

## 2023-05-04 DIAGNOSIS — E66.01 MORBID (SEVERE) OBESITY DUE TO EXCESS CALORIES (HCC): ICD-10-CM

## 2023-05-04 DIAGNOSIS — E78.2 MIXED HYPERLIPIDEMIA: ICD-10-CM

## 2023-05-04 LAB
ALBUMIN SERPL BCP-MCNC: 3.2 G/DL (ref 3.5–5)
ALP SERPL-CCNC: 106 U/L (ref 46–116)
ALT SERPL W P-5'-P-CCNC: 16 U/L (ref 12–78)
ANION GAP SERPL CALCULATED.3IONS-SCNC: 2 MMOL/L (ref 4–13)
AST SERPL W P-5'-P-CCNC: 12 U/L (ref 5–45)
BASOPHILS # BLD AUTO: 0.09 THOUSANDS/ÂΜL (ref 0–0.1)
BASOPHILS NFR BLD AUTO: 1 % (ref 0–1)
BILIRUB SERPL-MCNC: 0.33 MG/DL (ref 0.2–1)
BUN SERPL-MCNC: 11 MG/DL (ref 5–25)
CALCIUM ALBUM COR SERPL-MCNC: 9.7 MG/DL (ref 8.3–10.1)
CALCIUM SERPL-MCNC: 9.1 MG/DL (ref 8.3–10.1)
CHLORIDE SERPL-SCNC: 104 MMOL/L (ref 96–108)
CHOLEST SERPL-MCNC: 277 MG/DL
CO2 SERPL-SCNC: 30 MMOL/L (ref 21–32)
CREAT SERPL-MCNC: 0.67 MG/DL (ref 0.6–1.3)
EOSINOPHIL # BLD AUTO: 0.32 THOUSAND/ÂΜL (ref 0–0.61)
EOSINOPHIL NFR BLD AUTO: 2 % (ref 0–6)
ERYTHROCYTE [DISTWIDTH] IN BLOOD BY AUTOMATED COUNT: 13.7 % (ref 11.6–15.1)
GFR SERPL CREATININE-BSD FRML MDRD: 105 ML/MIN/1.73SQ M
GLUCOSE P FAST SERPL-MCNC: 105 MG/DL (ref 65–99)
HCT VFR BLD AUTO: 38.2 % (ref 34.8–46.1)
HDLC SERPL-MCNC: 58 MG/DL
HGB BLD-MCNC: 12.4 G/DL (ref 11.5–15.4)
IMM GRANULOCYTES # BLD AUTO: 0.1 THOUSAND/UL (ref 0–0.2)
IMM GRANULOCYTES NFR BLD AUTO: 1 % (ref 0–2)
LDLC SERPL CALC-MCNC: 174 MG/DL (ref 0–100)
LYMPHOCYTES # BLD AUTO: 4.83 THOUSANDS/ÂΜL (ref 0.6–4.47)
LYMPHOCYTES NFR BLD AUTO: 26 % (ref 14–44)
MCH RBC QN AUTO: 28.5 PG (ref 26.8–34.3)
MCHC RBC AUTO-ENTMCNC: 32.5 G/DL (ref 31.4–37.4)
MCV RBC AUTO: 88 FL (ref 82–98)
MONOCYTES # BLD AUTO: 1.05 THOUSAND/ÂΜL (ref 0.17–1.22)
MONOCYTES NFR BLD AUTO: 6 % (ref 4–12)
NEUTROPHILS # BLD AUTO: 12.18 THOUSANDS/ÂΜL (ref 1.85–7.62)
NEUTS SEG NFR BLD AUTO: 64 % (ref 43–75)
NONHDLC SERPL-MCNC: 219 MG/DL
NRBC BLD AUTO-RTO: 0 /100 WBCS
PLATELET # BLD AUTO: 508 THOUSANDS/UL (ref 149–390)
PMV BLD AUTO: 9.7 FL (ref 8.9–12.7)
POTASSIUM SERPL-SCNC: 3.9 MMOL/L (ref 3.5–5.3)
PROT SERPL-MCNC: 7.8 G/DL (ref 6.4–8.4)
RBC # BLD AUTO: 4.35 MILLION/UL (ref 3.81–5.12)
SODIUM SERPL-SCNC: 136 MMOL/L (ref 135–147)
TRIGL SERPL-MCNC: 226 MG/DL
TSH SERPL DL<=0.05 MIU/L-ACNC: 3.26 UIU/ML (ref 0.45–4.5)
WBC # BLD AUTO: 18.57 THOUSAND/UL (ref 4.31–10.16)

## 2023-05-04 RX ORDER — LEVOTHYROXINE SODIUM 0.1 MG/1
100 TABLET ORAL DAILY
Qty: 90 TABLET | Refills: 1 | Status: SHIPPED | OUTPATIENT
Start: 2023-05-04

## 2023-05-04 NOTE — PROGRESS NOTES
Presurgical Evaluation    Subjective:      Patient ID: Emerita Padilla is a 52 y o  female  Chief Complaint   Patient presents with    Pre-op Exam     Patient present today for pre-op clearance, pt is scheduled to have gastric bypass surgery on 5/19/2023  Patient presents today for preop evaluation for bariatric evaluation  She is also over due for her routine follow up  She did have labs done in December 2022  She has not been taking simvastatin  MRI brain for vertigo and visual distrubacne never completed but she noticed that these occured related to her fluid intake and dehydration  thyroid US over due last in 2021 with known nodules  On levothyroxine as well  Last labs in December 2022 were normal   taking vitamin d for the last 6-7 weeks of 50,000 units weekly  Last D level was 9 in December  Did see cardiology for pre op eval in April had EKG  Also saw [facility] hematology for consult on leukocytosis  secondary to psoriasis and immunotherapy  Psoriasis she is on immunotherapy and lesions has significantly resolved only has small partially active patch to the lower back  The following portions of the patient's history were reviewed and updated as appropriate: allergies, current medications, past family history, past medical history, past social history, past surgical history and problem list     Procedure date: May 19, 2023    Surgeon:  Dr Toni Montoya procedure: XI ROBOTIC  [address] , [address], POSSIBLE OPEN     Diagnosis for procedure: Morbid Obesity E66 01    Prior anesthesia: Yes   General; Complications:  None / Tolerated well    CAD History: None   NOTE: Patient should see Cardiology if time available before surgery, and if appropriate      Pulmonary History: None    Renal history: None    Diabetes History:  None     Neurological History: None     On Immunosuppressant meds/biologics: Yes      Review of Systems Constitutional: Negative for activity change, appetite change, diaphoresis, fatigue and fever  HENT: Negative for congestion, ear pain, postnasal drip, sinus pressure and sore throat  Eyes: Negative for discharge  Respiratory: Negative for cough, chest tightness and shortness of breath  Cardiovascular: Negative for chest pain and palpitations  Gastrointestinal: Negative for abdominal pain, diarrhea, nausea and vomiting  Genitourinary: Negative for difficulty urinating and dysuria  Skin: Positive for rash  Neurological: Negative for dizziness, light-headedness and headaches  Psychiatric/Behavioral: Negative for dysphoric mood and sleep disturbance  The patient is not nervous/anxious  Current Outpatient Medications   Medication Sig Dispense Refill    apixaban (ELIQUIS) 2 5 mg Take 2 5 mg by mouth 2 (two) times a day      clobetasol (TEMOVATE) 0 05 % cream APPLY TOPICALLY 2 (TWO) TIMES A DAY 60 g 3    cyclobenzaprine (FLEXERIL) 10 mg tablet TAKE 1 TABLET (10 MG TOTAL) BY MOUTH DAILY AT BEDTIME AS NEEDED FOR MUSCLE SPASMS 30 tablet 3    dimenhyDRINATE (DRAMAMINE) 50 mg tablet Take 50 mg by mouth 2 (two) times a day as needed for movement disorders      levothyroxine 100 mcg tablet Take 1 tablet (100 mcg total) by mouth daily 90 tablet 1    Multiple Vitamins-Minerals (MULTIVITAL PO) Take by mouth      Secukinumab (COSENTYX SC) Inject under the skin      simvastatin (ZOCOR) 40 mg tablet Take 1 tablet (40 mg total) by mouth daily at bedtime 90 tablet 3     No current facility-administered medications for this visit  Allergies on file:   Patient has no known allergies      Patient Active Problem List   Diagnosis    Mixed hyperlipidemia    Acquired hypothyroidism    Psoriasis    Morbid (severe) obesity due to excess calories (HCC)    Leukocytosis    Neck muscle spasm    Vertigo    Preoperative cardiovascular examination    Thyroid nodule    Pre-operative clearance    "Vitamin D deficiency        Past Medical History:   Diagnosis Date    Anxiety     Asthma     Disease of thyroid gland     hypo    Morbid obesity ([address] )     Psoriasis     Skin disorder     Vertigo        Past Surgical History:   Procedure Laterality Date     SECTION   &     2    KNEE SURGERY      LA EGD TRANSORAL BIOPSY SINGLE/MULTIPLE N/A 2018    Procedure: ESOPHAGOGASTRODUODENOSCOPY (EGD) with bx;  Surgeon: Betty Beck MD;  Location: [facility]; Service: Bariatrics       Family History   Problem Relation Age of Onset    No Known Problems Mother     Hypertension Father     Stomach cancer Family     Obesity Family        Social History     Tobacco Use    Smoking status: Never    Smokeless tobacco: Never   Vaping Use    Vaping Use: Never used   Substance Use Topics    Alcohol use: Yes     Comment: occasional    Drug use: No       Objective:    Vitals:    23 0812   BP: 128/74   BP Location: Right arm   Patient Position: Sitting   Cuff Size: Large   Pulse: 84   SpO2: 96%   Weight: 130 kg (286 lb)   Height: 5' 0 7\" (1 542 m)        Physical Exam  Vitals and nursing note reviewed  Constitutional:       General: She is not in acute distress  Appearance: Normal appearance  She is well-developed  She is obese  She is not ill-appearing, toxic-appearing or diaphoretic  HENT:      Head: Normocephalic and atraumatic  Right Ear: Tympanic membrane normal       Left Ear: Tympanic membrane normal       Nose: Nose normal       Mouth/Throat:      Mouth: Mucous membranes are moist    Eyes:      Extraocular Movements: Extraocular movements intact  Conjunctiva/sclera: Conjunctivae normal       Pupils: Pupils are equal, round, and reactive to light  Neck:      Thyroid: No thyromegaly  Vascular: No carotid bruit or JVD  Cardiovascular:      Rate and Rhythm: Normal rate and regular rhythm        Pulses:           Carotid pulses are 2+ on the right side and 2+ on the " left side  Heart sounds: Normal heart sounds, S1 normal and S2 normal    Pulmonary:      Effort: Pulmonary effort is normal       Breath sounds: Normal breath sounds  Abdominal:      General: Abdomen is flat  Bowel sounds are normal  There is no distension  Palpations: Abdomen is soft  Musculoskeletal:      Right lower leg: No edema  Left lower leg: No edema  Lymphadenopathy:      Cervical: No cervical adenopathy  Skin:     Findings: Rash present  Comments: Small psoriatic lesions of the lower back    Neurological:      General: No focal deficit present  Mental Status: She is alert and oriented to person, place, and time  Psychiatric:         Mood and Affect: Mood normal          Behavior: Behavior normal  Behavior is cooperative  Thought Content: Thought content normal          Judgment: Judgment normal            Preop labs/testing available and reviewed: no               EKG yes    Echo no    Stress test/cath no    PFT/[place] no    Functional capacity: Climb stairs                        4 Mets   Pick the highest level patient can comfortably perform   4 mets or greater for surgery    RCRI  High Risk surgery? 1 Point  CAD History:         1 Point   MI; Positive Stress Test; CP due to Mi;  Nitrate Usage to control Angina; Pathologic Q wave on EKG  CHF Active:         1 Point   Pulm Edema; Paroxysmal Nocturnal Dyspnea;  Bibasilar Rales (crackles);S3; CHF on CXR  Cerebrovascular Disease (TIA or CVA):     1 Point  DM on Insulin:        1 Point  Serum Creat >2 0 mg/dl:       1 Point          Total Points: 0     Scorin: Class I, Very Low Risk (0 4%)     1: Class II, Low risk (0 9%)     2: Class III Moderate (6 6%)     3: Class IV High (>11%)      DENNYS Risk:  GFR:        For PCP:  If GFR>60, Hold ACE/ARB/Diuretic on the day of surgery, and NSAIDS 10 days before  If GFR<45, Consider PRE and POST op Nephrology Consult  If 46 <GFR> 59 :  Has Patient had DENNYS in last 6 Months? no   If YES: Preop Nephrology consult   If No:  [facility] Nephrology consult  Assessment/Plan:    Patient is medically optimized (cleared) for the planned procedure  Further testing/evaluation is required  PRE OP LABS NOT DONE    Postop concerns: yes  postoperative VTE prophylaxis with eliquis 2 5mg po bid x 30 days after bariatric surgery for BMI >50 per protocol  This per Dr Andrés Henriquez office patient already has savings coupon       Problem List Items Addressed This Visit        Endocrine    Acquired hypothyroidism     She is on levothyroxine 100mcg  She is due for tsh  Last tsh in December was euthyroid           Relevant Medications    levothyroxine 100 mcg tablet    Other Relevant Orders    Lipid panel    TSH, 3rd generation with Free T4 reflex    US thyroid    Thyroid nodule     Patient last US was in 2021  She is due for ultrasound again  This was ordered  Relevant Medications    levothyroxine 100 mcg tablet    Other Relevant Orders    TSH, 3rd generation with Free T4 reflex    US thyroid       Musculoskeletal and Integument    Psoriasis     Currently on cosentyx every 8-12 weeks  She has already been holding this in anticipation for surgery  Other    Mixed hyperlipidemia     Was prescribed simvastatin 40mg  Her recent cholesterol is significantly elevated but she is having bariatric surgery  We will withhold medications at this time allow for weight loss and see if statin is still needed  Morbid (severe) obesity due to excess calories (HCC)    Leukocytosis     Secondary to psoriasis  Had seen in consultation with hematology and this was their diagnosis  Pre-operative clearance - Primary     Patient is cleared pending pre op  Labs which will be drawn today     She has EKG done which was normal and saw cardiology for this on 4/13 through [facility]          Relevant Orders    CBC and differential    Comprehensive metabolic panel    Vitamin D deficiency Currently on 50,000 units weekly         Other Visit Diagnoses     Screening for colon cancer        Relevant Orders    Ambulatory Referral to Gastroenterology    Encounter for screening mammogram for malignant neoplasm of breast        Relevant Orders    Mammo screening bilateral w 3d & cad           Diagnoses and all orders for this visit:    Pre-operative clearance  -     Cancel: POCT ECG  -     CBC and differential; Future  -     Comprehensive metabolic panel; Future    Morbid (severe) obesity due to excess calories (HCC)    Psoriasis    Thyroid nodule  -     TSH, 3rd generation with Free T4 reflex; Future  -     US thyroid; Future    Acquired hypothyroidism  -     Lipid panel  -     TSH, 3rd generation with Free T4 reflex; Future  -     US thyroid; Future  -     levothyroxine 100 mcg tablet; Take 1 tablet (100 mcg total) by mouth daily    Mixed hyperlipidemia    Vitamin D deficiency    Leukocytosis, unspecified type    Screening for colon cancer  -     Ambulatory Referral to Gastroenterology; Future    Encounter for screening mammogram for malignant neoplasm of breast  -     Mammo screening bilateral w 3d & cad; Future    Other orders  -     apixaban (ELIQUIS) 2 5 mg; Take 2 5 mg by mouth 2 (two) times a day  -     Multiple Vitamins-Minerals (MULTIVITAL PO); Take by mouth          Pre-Surgery Instructions:   Medication Instructions    apixaban (ELIQUIS) 2 5 mg per anesthesia guidelines     clobetasol (TEMOVATE) 0 05 % cream per anesthesia guidelines     cyclobenzaprine (FLEXERIL) 10 mg tablet Take morning of surgery    dimenhyDRINATE (DRAMAMINE) 50 mg tablet per anesthesia guidelines     levothyroxine 100 mcg tablet per anesthesia guidelines     Multiple Vitamins-Minerals (MULTIVITAL PO) per anesthesia guidelines     Secukinumab (COSENTYX SC) per anesthesia guidelines     simvastatin (ZOCOR) 40 mg tablet per anesthesia guidelines         BMI Counseling: Body mass index is 54 57 kg/m²   The BMI is above "normal  Nutrition recommendations include decreasing portion sizes, moderation in carbohydrate intake, reducing intake of saturated and trans fat and reducing intake of cholesterol  Exercise recommendations include moderate physical activity 150 minutes/week and strength training exercises  Rationale for BMI follow-up plan is due to patient being overweight or obese  Depression Screening and Follow-up Plan: Patient was screened for depression during today's encounter  They screened negative with a PHQ-2 score of 0  NOTE: Please use the above to review important meds for your specialty, the remainder \"per anesthesia Guidelines  \"    NOTE: Please place an Inbasket message for \"SOC\" pool for complicated patients      "

## 2023-05-04 NOTE — PROGRESS NOTES
"Name: Pallavi Alas      : 1975      MRN: [id number]  Encounter Provider: AMALIA Clay  Encounter Date: 2023   Encounter department: [facility]    Assessment & Plan     1  Pre-operative clearance  -     POCT ECG    2  Encounter for screening mammogram for malignant neoplasm of breast    3  Screening for colon cancer           Subjective      HPI  Review of Systems    Current Outpatient Medications on File Prior to Visit   Medication Sig    apixaban (ELIQUIS) 2 5 mg Take 2 5 mg by mouth 2 (two) times a day    clobetasol (TEMOVATE) 0 05 % cream APPLY TOPICALLY 2 (TWO) TIMES A DAY    cyclobenzaprine (FLEXERIL) 10 mg tablet TAKE 1 TABLET (10 MG TOTAL) BY MOUTH DAILY AT BEDTIME AS NEEDED FOR MUSCLE SPASMS    dimenhyDRINATE (DRAMAMINE) 50 mg tablet Take 50 mg by mouth 2 (two) times a day as needed for movement disorders    levothyroxine 100 mcg tablet TAKE 1 TABLET BY MOUTH EVERY DAY    Multiple Vitamins-Minerals (MULTIVITAL PO) Take by mouth    Secukinumab (COSENTYX SC) Inject under the skin    simvastatin (ZOCOR) 40 mg tablet Take 1 tablet (40 mg total) by mouth daily at bedtime    [DISCONTINUED] liraglutide (SAXENDA) injection Inject 0 1 mL (0 6 mg total) under the skin daily for 7 days, THEN 0 2 mL (1 2 mg total) daily for 7 days, THEN 0 3 mL (1 8 mg total) daily for 7 days, THEN 0 4 mL (2 4 mg total) daily for 7 days, THEN 0 5 mL (3 mg total) daily         Objective     /74 (BP Location: Right arm, Patient Position: Sitting, Cuff Size: Large)   Pulse 84   Ht 5' 0 7\" (1 542 m)   Wt 130 kg (286 lb)   SpO2 96%   BMI 54 57 kg/m²     Physical Exam  AMALIA Clay  "

## 2023-05-04 NOTE — ASSESSMENT & PLAN NOTE
Currently on cosentyx every 8-12 weeks  She has already been holding this in anticipation for surgery

## 2023-05-04 NOTE — TELEPHONE ENCOUNTER
----- Message from Nehemiah Mcneil sent at 5/4/2023 11:54 AM EDT -----  Regarding: care gap request  05/04/23 11:54 AM    Hello, our patient attached above has had HIV completed/performed  Please assist in updating the patient chart by pulling the Care Everywhere (CE) document  The date of service is 8/18/2006       Thank you,  Nehemiah Mcneil  [facility]

## 2023-05-04 NOTE — ASSESSMENT & PLAN NOTE
Patient is cleared pending pre op  Labs which will be drawn today     She has EKG done which was normal and saw cardiology for this on 4/13 through [facility]

## 2023-05-04 NOTE — TELEPHONE ENCOUNTER
Upon review of the In Basket request we were able to locate, review, and update the patient chart as requested for HIV  Any additional questions or concerns should be emailed to the Practice Liaisons via the appropriate education email address, please do not reply via In Basket      Thank you  Deondre Lines

## 2023-05-04 NOTE — ASSESSMENT & PLAN NOTE
Was prescribed simvastatin 40mg  Her recent cholesterol is significantly elevated but she is having bariatric surgery  We will withhold medications at this time allow for weight loss and see if statin is still needed

## 2023-09-29 ENCOUNTER — PATIENT MESSAGE (OUTPATIENT)
Age: 48
End: 2023-09-29

## 2023-09-29 ENCOUNTER — TELEPHONE (OUTPATIENT)
Age: 48
End: 2023-09-29

## 2023-09-29 NOTE — TELEPHONE ENCOUNTER
09/29/23  Screened by: Pura Zhu    Referring Provider DR Antonia Quintana    Pre- Screening: There is no height or weight on file to calculate BMI. Has patient been referred for a routine screening Colonoscopy? YES  Is the patient between 43-73 years old? YES      Previous Colonoscopy [facility]   If yes:    Date:     Facility:     Reason:       SCHEDULING STAFF: If the patient is between 39yrs-51yrs, please advise patient to confirm benefits/coverage with their insurance company for a routine screening colonoscopy, some insurance carriers will only cover at [address] [place] [address] or older. If the patient is over 66years old, please schedule an office visit. Does the patient want to see a Gastroenterologist prior to their procedure OR are they having any GI symptoms? NO    Has the patient been hospitalized or had abdominal surgery in the past 6 months? YES - GASTRIC BYPASS 5/2023    Does the patient use supplemental oxygen? NO    Does the patient take Coumadin, Lovenox, Plavix, Elliquis, Xarelto, or other blood thinning medication? NO    Has the patient had a stroke, cardiac event, or stent placed in the past year? NO      FAILED OA     SCHEDULING STAFF: If patient answers NO to above questions, then schedule procedure. If patient answers YES to above questions, then schedule office appointment. If patient is between 45yrs - 49yrs, please advise patient that we will have to confirm benefits & coverage with their insurance company for a routine screening colonoscopy.

## 2023-09-29 NOTE — TELEPHONE ENCOUNTER
ASC Screening    ASC Screening  BMI > than 45: No  Are you currently pregnant?: No  Do you rely on a wheelchair for mobility?: No  Do you need oxygen during the day?: No  Have you ever been informed by anesthesia that you have a difficult airway?: No  Have you been diagnosed with End Stage Renal Disease (ESRD)?: No  Are you actively on dialysis?: No  Have you been diagnosed with Pulmonary Hypertension?: No  Do you have a pacemaker or an Automatic Implantable Cardioverter Defibrillator (AICD)?: No  Have you ever had an organ transplant?: No  Have you had a stroke, heart attack, myocardial infarction (MI) within the last 6 months?: No  Have you ever been diagnosed with Aortic Stenosis?: No  Have you ever been diagnosed  with Congestive Heart Failure?: No  Have you ever been diagnosed with a heart valve disease?: No  Are you Diabetic?: No  If you are Diabetic, has your A1C been greater than 12 within the last six months?: Yes

## 2023-09-29 NOTE — TELEPHONE ENCOUNTER
Scheduled date of colonoscopy (as of today):  [facility] 11/20/23  Physician performing colonoscopy: DR. Gladys Hall  Location of colonoscopy: [facility]  Bowel prep reviewed with patient:  Gladis Mclaughlin / Carla Kidd

## 2023-11-02 ENCOUNTER — ANESTHESIA (OUTPATIENT)
Dept: ANESTHESIOLOGY | Facility: HOSPITAL | Age: 48
End: 2023-11-02

## 2023-11-02 ENCOUNTER — ANESTHESIA EVENT (OUTPATIENT)
Dept: ANESTHESIOLOGY | Facility: HOSPITAL | Age: 48
End: 2023-11-02

## 2023-11-09 ENCOUNTER — TELEPHONE (OUTPATIENT)
Age: 48
End: 2023-11-09

## 2023-11-09 NOTE — TELEPHONE ENCOUNTER
Called patient left message for patient to return call to reschedule her colonoscopy to a hospital setting per Anesthesiologist. Procedure scheduled for 11/20/23 will be cancelled.     Please see note;

## 2024-07-19 ENCOUNTER — TELEPHONE (OUTPATIENT)
Dept: ADMINISTRATIVE | Facility: OTHER | Age: 49
End: 2024-07-19

## 2024-07-19 NOTE — TELEPHONE ENCOUNTER
----- Message from Yocasta MILLIGAN sent at 7/18/2024  3:17 PM EDT -----  Regarding: care gap request  07/18/24 3:17 PM    Hello, our patient attached above has had CRC: Colonoscopy completed/performed. Please assist in updating the patient chart by pulling the Care Everywhere (CE) document. The date of service is 7/18/2024.     Thank you,  Yocasta Mathew [facility]

## 2024-07-19 NOTE — TELEPHONE ENCOUNTER
Upon review of the In Basket request we were able to locate, review, and update the patient chart as requested for CRC: Colonoscopy.    Any additional questions or concerns should be emailed to the Practice Liaisons via the appropriate education email address, please do not reply via In Basket.    Thank you  Chandni Ward   [facility]

## 2024-10-24 NOTE — PATIENT INSTRUCTIONS
Neck Exercises   WHAT YOU NEED TO KNOW:   Why is it important to do neck exercises? Neck exercises help reduce neck pain, and improve neck movement and strength  Neck exercises also help prevent long-term neck problems  What do I need to know about neck exercises? · Do the exercises every day,  or as often as directed by your healthcare provider  · Move slowly, gently, and smoothly  Avoid fast or jerky motions  · Stand and sit the way your healthcare provider shows you  Good posture may reduce your neck pain  Check your posture often, even when you are not doing your neck exercises  How do I perform neck exercises safely? · Exercise position:  You may sit or stand while you do neck exercises  Face forward  Your shoulders should be straight and relaxed, with a good posture  · Head tilts, forward and back:  Gently bow your head and try to touch your chin to your chest  Your healthcare provider may tell you to push on the back of your neck to help bow your head  Raise your chin back to the starting position  Tilt your head back as far as possible so you are looking up at the ceiling  Your healthcare provider may tell you to lift your chin to help tilt your head back  Return your head to the starting position  · Head tilts, side to side:  Tilt your head, bringing your ear toward your shoulder  Then tilt your head toward the other shoulder  · Head turns:  Turn your head to look over your shoulder  Tilt your chin down and try to touch it to your shoulder  Do not raise your shoulder to your chin  Face forward again  Do the same on the other side  · Head rolls:  Slowly bring your chin toward your chest  Next, roll your head to the right  Your ear should be positioned over your shoulder  Hold this position for 5 seconds  Roll your head back toward your chest and to the left into the same position  Hold for 5 seconds   Gently roll your head back and around in a clockwise [place] 3 times  Next, move your head in the reverse direction (counterclockwise) in a [place] 3 times  Do not shrug your shoulders upwards while you do this exercise  When should I contact my healthcare provider? · Your pain does not get better, or gets worse  · You have questions or concerns about your condition, care, or exercise program   CARE AGREEMENT:   You have the right to help plan your care  Learn about your health condition and how it may be treated  Discuss treatment options with your caregivers to decide what care you want to receive  You always have the right to refuse treatment  The above information is an  only  It is not intended as medical advice for individual conditions or treatments  Talk to your doctor, nurse or pharmacist before following any medical regimen to see if it is safe and effective for you  © 2017 [employer] Information is for End User's use only and may not be sold, redistributed or otherwise used for commercial purposes  All illustrations and images included in CareNotes® are the copyrighted property of [employer]  or Rashard Cyr  Adult

## 2024-11-14 ENCOUNTER — TELEPHONE (OUTPATIENT)
Age: 49
End: 2024-11-14

## 2024-11-14 DIAGNOSIS — R92.8 ABNORMALITY OF RIGHT BREAST ON SCREENING MAMMOGRAPHY: Primary | ICD-10-CM

## 2024-11-14 NOTE — TELEPHONE ENCOUNTER
Rcvd call from [facility]. Patient had an abnormal mammogram. Needs further testing. Please send order for:  Right breast diagnostic 3D mammogram with CAD and right breast limited US.   Code: R92.8     Fax to: [phone].    Scheduled for 11/20.

## 2024-11-29 ENCOUNTER — RESULTS FOLLOW-UP (OUTPATIENT)
Dept: FAMILY MEDICINE CLINIC | Facility: CLINIC | Age: 49
End: 2024-11-29

## 2024-11-29 DIAGNOSIS — R92.8 ABNORMALITY OF RIGHT BREAST ON SCREENING MAMMOGRAPHY: Primary | ICD-10-CM

## 2024-11-29 DIAGNOSIS — D24.1 FIBROADENOMA OF BREAST, RIGHT: ICD-10-CM

## 2025-03-13 ENCOUNTER — OFFICE VISIT (OUTPATIENT)
Dept: FAMILY MEDICINE CLINIC | Facility: CLINIC | Age: 50
End: 2025-03-13
Payer: COMMERCIAL

## 2025-03-13 VITALS
HEIGHT: 61 IN | BODY MASS INDEX: 39.27 KG/M2 | WEIGHT: 208 LBS | OXYGEN SATURATION: 97 % | DIASTOLIC BLOOD PRESSURE: 60 MMHG | SYSTOLIC BLOOD PRESSURE: 118 MMHG | HEART RATE: 66 BPM | RESPIRATION RATE: 16 BRPM | TEMPERATURE: 95.6 F

## 2025-03-13 DIAGNOSIS — E04.1 THYROID NODULE: ICD-10-CM

## 2025-03-13 DIAGNOSIS — E55.9 VITAMIN D DEFICIENCY: ICD-10-CM

## 2025-03-13 DIAGNOSIS — Z00.00 ANNUAL PHYSICAL EXAM: Primary | ICD-10-CM

## 2025-03-13 DIAGNOSIS — E78.2 MIXED HYPERLIPIDEMIA: ICD-10-CM

## 2025-03-13 DIAGNOSIS — E66.01 MORBID (SEVERE) OBESITY DUE TO EXCESS CALORIES (HCC): ICD-10-CM

## 2025-03-13 DIAGNOSIS — K91.2 POSTOPERATIVE MALABSORPTION: ICD-10-CM

## 2025-03-13 DIAGNOSIS — R14.3 EXCESSIVE GAS: ICD-10-CM

## 2025-03-13 DIAGNOSIS — R73.03 PREDIABETES: Chronic | ICD-10-CM

## 2025-03-13 DIAGNOSIS — E03.9 ACQUIRED HYPOTHYROIDISM: ICD-10-CM

## 2025-03-13 PROBLEM — Z01.818 PRE-OPERATIVE CLEARANCE: Status: RESOLVED | Noted: 2023-05-04 | Resolved: 2025-03-13

## 2025-03-13 PROBLEM — Z01.810 PREOPERATIVE CARDIOVASCULAR EXAMINATION: Status: RESOLVED | Noted: 2021-05-26 | Resolved: 2025-03-13

## 2025-03-13 PROCEDURE — 99396 PREV VISIT EST AGE 40-64: CPT | Performed by: NURSE PRACTITIONER

## 2025-03-13 PROCEDURE — 99214 OFFICE O/P EST MOD 30 MIN: CPT | Performed by: NURSE PRACTITIONER

## 2025-03-13 NOTE — ASSESSMENT & PLAN NOTE
Recommend gas ex with meals, also start probiotic daily and peppermint oil  She has follow up with GI

## 2025-03-13 NOTE — ASSESSMENT & PLAN NOTE
Patient has lost weight since this was last check  New A1c ordered   She had weight loss surgery   Orders:  •  Hemoglobin A1C

## 2025-03-13 NOTE — PROGRESS NOTES
"Adult Annual Physical  Name: Alycia Duenas      : 1975      MRN: [id number]  Encounter Provider: AMALIA Acosta  Encounter Date: 3/13/2025   Encounter department: [facility] PRIMARY [facility]ARE    Assessment & Plan    Preventive Screenings:    - Breast cancer screening: screening up-to-date     Immunizations:  - Immunizations due: Influenza and Tdap         History of Present Illness   {?Quick Links Encounters * My Last Note * Last Note in Specialty * Snapshot * Since Last Visit * History :45620}  Adult Annual Physical:  Patient presents for annual physical.     Diet and Physical Activity:  - Diet/Nutrition: portion control.  - Exercise: walking, 1-2 times a week on average and 30-60 minutes on average.    Depression Screening:  - PHQ-2 Score: 0    General Health:  - Sleep: 4-6 hours of sleep on average.    Review of Systems   Gastrointestinal:  Positive for abdominal pain.     {Select to Display PMH (Optional):33671}    Objective {?Quick Links Trend Vitals * Enter New Vitals * Results Review * Timeline (Adult) * Labs * Imaging * Cardiology * Procedures * Lung Cancer Screening * Surgical eConsent :81436}  /60 (BP Location: Left arm, Patient Position: Sitting, Cuff Size: Adult)   Pulse 66   Temp (!) 95.6 °F (35.3 °C) (Tympanic)   Resp 16   Ht 5' 1\" (1.549 m)   Wt 94.3 kg (208 lb)   SpO2 97%   BMI 39.30 kg/m²     Physical Exam  {Administrative / Billing Section (Optional):76985}  "

## 2025-03-13 NOTE — ASSESSMENT & PLAN NOTE
Patient has weight loss surgery. Not taking cholesterol mediation  Due for labs to assess this   Orders:  •  Lipid Panel with Direct LDL reflex; Future

## 2025-03-13 NOTE — ASSESSMENT & PLAN NOTE
Patient last ultrasound was in 2021. She had previous orders not complete  New ultrasound ordered today  She had also previously recommended to check with ENT due to nodule size.   Orders:  •  US thyroid; Future

## 2025-03-13 NOTE — PATIENT INSTRUCTIONS
"Patient Education     Routine physical for adults   The Basics   Written by the doctors and editors at [facility]   What is a physical? -- A physical is a routine visit, or \"check-up,\" with your doctor. You might also hear it called a \"wellness visit\" or \"preventive visit.\"  During each visit, the doctor will:   Ask about your physical and mental health   Ask about your habits, behaviors, and lifestyle   Do an exam   Give you vaccines if needed   Talk to you about any medicines you take   Give advice about your health   Answer your questions  Getting regular check-ups is an important part of taking care of your health. It can help your doctor find and treat any problems you have. But it's also important for preventing health problems.  A routine physical is different from a \"sick visit.\" A sick visit is when you see a doctor because of a health concern or problem. Since physicals are scheduled ahead of time, you can think about what you want to ask the doctor.  How often should I get a physical? -- It depends on your age and health. In general, for people age 21 years and older:   If you are younger than 50 years, you might be able to get a physical every 3 years.   If you are 50 years or older, your doctor might recommend a physical every year.  If you have an ongoing health condition, like diabetes or high blood pressure, your doctor will probably want to see you more often.  What happens during a physical? -- In general, each visit will include:   Physical exam - The doctor or nurse will check your height, weight, heart rate, and blood pressure. They will also look at your eyes and ears. They will ask about how you are feeling and whether you have any symptoms that bother you.   Medicines - It's a good idea to bring a list of all the medicines you take to each doctor visit. Your doctor will talk to you about your medicines and answer any questions. Tell them if you are having any side effects that bother you. You " "should also tell them if you are having trouble paying for any of your medicines.   Habits and behaviors - This includes:   Your diet   Your exercise habits   Whether you smoke, drink alcohol, or use drugs   Whether you are sexually active   Whether you feel safe at home  Your doctor will talk to you about things you can do to improve your health and lower your risk of health problems. They will also offer help and support. For example, if you want to quit smoking, they can give you advice and might prescribe medicines. If you want to improve your diet or get more physical activity, they can help you with this, too.   Lab tests, if needed - The tests you get will depend on your age and situation. For example, your doctor might want to check your:   Cholesterol   Blood sugar   Iron level   Vaccines - The recommended vaccines will depend on your age, health, and what vaccines you already had. Vaccines are very important because they can prevent certain serious or deadly infections.   Discussion of screening - \"Screening\" means checking for diseases or other health problems before they cause symptoms. Your doctor can recommend screening based on your age, risk, and preferences. This might include tests to check for:   Cancer, such as breast, prostate, cervical, ovarian, colorectal, prostate, lung, or skin cancer   Sexually transmitted infections, such as chlamydia and gonorrhea   Mental health conditions like depression and anxiety  Your doctor will talk to you about the different types of screening tests. They can help you decide which screenings to have. They can also explain what the results might mean.   Answering questions - The physical is a good time to ask the doctor or nurse questions about your health. If needed, they can refer you to other doctors or specialists, too.  Adults older than 65 years often need other care, too. As you get older, your doctor will talk to you about:   How to prevent falling at " home   Hearing or vision tests   Memory testing   How to take your medicines safely   Making sure that you have the help and support you need at home  All topics are updated as new evidence becomes available and our peer review process is complete.  This topic retrieved from [employer] on: May 02, 2024.  Topic 057805 Version 1.0  Release: 32.4.3 - C32.122  © 2024 UpToDate, Inc. and/or its affiliates. All rights reserved.  Consumer Information Use and Disclaimer   Disclaimer: This generalized information is a limited summary of diagnosis, treatment, and/or medication information. It is not meant to be comprehensive and should be used as a tool to help the user understand and/or assess potential diagnostic and treatment options. It does NOT include all information about conditions, treatments, medications, side effects, or risks that may apply to a specific patient. It is not intended to be medical advice or a substitute for the medical advice, diagnosis, or treatment of a health care provider based on the health care provider's examination and assessment of a patient's specific and unique circumstances. Patients must speak with a health care provider for complete information about their health, medical questions, and treatment options, including any risks or benefits regarding use of medications. This information does not endorse any treatments or medications as safe, effective, or approved for treating a specific patient. UpToDate, Inc. and its affiliates disclaim any warranty or liability relating to this information or the use thereof.The use of this information is governed by the Terms of Use, available at https://www.wolters5 examplesuwer.com/en/know/clinical-effectiveness-terms. 2024© UpToDate, Inc. and its affiliates and/or licensors. All rights reserved.  Copyright   © 2024 Up[employer]Date, Inc. and/or its affiliates. All rights reserved.

## 2025-03-13 NOTE — ASSESSMENT & PLAN NOTE
Doesn't appear patient has had post operative nutrition labs  Advised to follow up with weight management  She is taking her bariatric vitamins and additional calcium   Orders:  •  CBC and differential; Future  •  Vitamin D 25 hydroxy  •  Vitamin B12  •  Vitamin B1, whole blood; Future  •  Vitamin A; Future  •  PTH, intact; Future  •  Iron Panel (Includes Ferritin, Iron Sat%, Iron, and TIBC); Future

## 2025-03-13 NOTE — PROGRESS NOTES
Adult Annual Physical  Name: Alycia Duenas      : 1975      MRN: [id number]  Encounter Provider: AMALIA Acosta  Encounter Date: 3/13/2025   Encounter department: [facility]    Assessment & Plan  Annual physical exam         Acquired hypothyroidism  Patient is not currently taking any thyroid medication  She is not having symptoms  Recheck TSH   Orders:  •  TSH, 3rd generation with Free T4 reflex; Future    Thyroid nodule  Patient last ultrasound was in . She had previous orders not complete  New ultrasound ordered today  She had also previously recommended to check with ENT due to nodule size.   Orders:  •  US thyroid; Future    Mixed hyperlipidemia  Patient has weight loss surgery. Not taking cholesterol mediation  Due for labs to assess this   Orders:  •  Lipid Panel with Direct LDL reflex; Future    Morbid (severe) obesity due to excess calories (HCC)  Patient has gastric bypass 2 years ago  Lost 80 pounds        Vitamin D deficiency  Patient is taking bariatric vitamin   Due to reasses labs        Excessive gas  Recommend gas ex with meals, also start probiotic daily and peppermint oil  She has follow up with GI        Prediabetes  Patient has lost weight since this was last check  New A1c ordered   She had weight loss surgery   Orders:  •  Hemoglobin A1C    Postoperative malabsorption  Doesn't appear patient has had post operative nutrition labs  Advised to follow up with weight management  She is taking her bariatric vitamins and additional calcium   Orders:  •  CBC and differential; Future  •  Vitamin D 25 hydroxy  •  Vitamin B12  •  Vitamin B1, whole blood; Future  •  Vitamin A; Future  •  PTH, intact; Future  •  Iron Panel (Includes Ferritin, Iron Sat%, Iron, and TIBC); Future    Preventive Screenings:  - Diabetes Screening: orders placed  - Cholesterol Screening: has hyperlipidemia and orders placed   - Hepatitis C screening: screening up-to-date   - HIV  screening: screening up-to-date   - Cervical cancer screening: risks/benefits discussed and orders placed   - Breast cancer screening: screening up-to-date   - Colon cancer screening: screening up-to-date   - Lung cancer screening: screening not indicated     Immunizations:    - The patient declines recommended vaccines currently despite my recommendations      Counseling/Anticipatory Guidance:    - Dental health: discussed importance of regular tooth brushing, flossing, and dental visits.   - Diet: discussed recommendations for a healthy/well-balanced diet.   - Exercise: the importance of regular exercise/physical activity was discussed. Recommend exercise 3-5 times per week for at least 30 minutes.       Depression Screening and Follow-up Plan: Patient was screened for depression during today's encounter. They screened negative with a PHQ-2 score of 0.          History of Present Illness     Adult Annual Physical:  Patient presents for annual physical. Patient had bypass with LVH has lost 80 pounds in the last two years. She is doing well  She is also here to discuss some concerns related to being in the ER  She had stomach pain and then at the time she got to the ER her pain went away  She did have scan done it was all normal    She has been having GI issues with passing a lot of gas- has tried tea with chamomile, faizan. She started probiotic weekly   She reports she had a colonoscopy already  She is scheduled to see GI in June  She eliminated soda, eating less sugar, less carbs. Limiting processed foods.     Patient is not on any of her medications since surgery- she has not felt sluggish or having any symptoms of thyroid disorder  Not having any constipation .     Diet and Physical Activity:  - Diet/Nutrition: portion control.  - Exercise: walking, 1-2 times a week on average and 30-60 minutes on average.    Depression Screening:  - PHQ-2 Score: 0    General Health:  - Sleep: 4-6 hours of sleep on average.  -  "Hearing: normal hearing bilateral ears.  - Vision: wears glasses and most recent eye exam < 1 year ago.  - Dental: regular dental visits.    /GYN Health:  - Follows with GYN: no.   - Menopause: perimenopausal.     Review of Systems   Gastrointestinal:  Positive for abdominal pain.         Objective   /60 (BP Location: Left arm, Patient Position: Sitting, Cuff Size: Adult)   Pulse 66   Temp (!) 95.6 °F (35.3 °C) (Tympanic)   Resp 16   Ht 5' 1\" (1.549 m)   Wt 94.3 kg (208 lb)   SpO2 97%   BMI 39.30 kg/m²     Physical Exam  Vitals and nursing note reviewed.   Constitutional:       General: She is not in acute distress.     Appearance: Normal appearance. She is well-developed. She is obese. She is not diaphoretic.   HENT:      Head: Normocephalic and atraumatic.      Right Ear: Tympanic membrane and external ear normal.      Left Ear: Tympanic membrane and external ear normal.      Nose: Nose normal.      Mouth/Throat:      Mouth: Mucous membranes are moist.      Pharynx: No oropharyngeal exudate or posterior oropharyngeal erythema.   Eyes:      Extraocular Movements: Extraocular movements intact.      Conjunctiva/sclera: Conjunctivae normal.      Pupils: Pupils are equal, round, and reactive to light.   Neck:      Thyroid: No thyromegaly.      Vascular: No carotid bruit or JVD.   Cardiovascular:      Rate and Rhythm: Normal rate and regular rhythm.      Pulses:           Carotid pulses are 2+ on the right side and 2+ on the left side.     Heart sounds: Normal heart sounds, S1 normal and S2 normal. No murmur heard.  Pulmonary:      Effort: Pulmonary effort is normal.      Breath sounds: Normal breath sounds.   Abdominal:      General: Bowel sounds are normal.      Palpations: Abdomen is soft.      Tenderness: There is no abdominal tenderness.   Musculoskeletal:         General: Normal range of motion.      Cervical back: Normal range of motion.      Right lower leg: No edema.      Left lower leg: No " edema.   Neurological:      Mental Status: She is alert and oriented to person, place, and time.      Motor: Motor function is intact.      Coordination: Coordination is intact.      Gait: Gait is intact.   Psychiatric:         Mood and Affect: Mood normal.         Behavior: Behavior normal.         Thought Content: Thought content normal.         Judgment: Judgment normal.

## 2025-03-13 NOTE — ASSESSMENT & PLAN NOTE
Patient is not currently taking any thyroid medication  She is not having symptoms  Recheck TSH   Orders:  •  TSH, 3rd generation with Free T4 reflex; Future

## 2025-05-13 ENCOUNTER — TELEPHONE (OUTPATIENT)
Age: 50
End: 2025-05-13

## 2025-05-13 DIAGNOSIS — R92.8 ABNORMALITY OF RIGHT BREAST ON SCREENING MAMMOGRAPHY: Primary | ICD-10-CM

## 2025-05-14 ENCOUNTER — TELEPHONE (OUTPATIENT)
Age: 50
End: 2025-05-14

## 2025-05-14 NOTE — TELEPHONE ENCOUNTER
Cyndi @ [facility] called back and they do not have the order for the Mammo scheduled for tomorrow.  Pls fax to .      Thank you

## 2025-05-14 NOTE — TELEPHONE ENCOUNTER
KWAKU: Cyndi from [facility] was asking for a referral for a mammogram for the patient since she has an appointment for tomorrow 05/15/2025 at 7:45 am. I informed Cyndi that there was an order under imaging, but the expected date was for 05/29/2025. Cyndi stated since the referral was placed in 11/29/2024 that it is still good for one year.

## 2025-05-14 NOTE — TELEPHONE ENCOUNTER
[facility]ammo [facility] was faxed twice to make zaheer e that  [facility].

## 2025-05-21 ENCOUNTER — RESULTS FOLLOW-UP (OUTPATIENT)
Dept: FAMILY MEDICINE CLINIC | Facility: CLINIC | Age: 50
End: 2025-05-21

## 2025-05-21 DIAGNOSIS — R92.8 ABNORMALITY OF RIGHT BREAST ON SCREENING MAMMOGRAPHY: Primary | ICD-10-CM

## (undated) DEVICE — SINGLE-USE BIOPSY FORCEPS: Brand: RADIAL JAW 4